# Patient Record
Sex: MALE | Race: BLACK OR AFRICAN AMERICAN | NOT HISPANIC OR LATINO | Employment: UNEMPLOYED | URBAN - METROPOLITAN AREA
[De-identification: names, ages, dates, MRNs, and addresses within clinical notes are randomized per-mention and may not be internally consistent; named-entity substitution may affect disease eponyms.]

---

## 2020-12-16 ENCOUNTER — HISTORICAL (OUTPATIENT)
Dept: ADMINISTRATIVE | Facility: HOSPITAL | Age: 55
End: 2020-12-16

## 2020-12-16 LAB
ABS NEUT (OLG): 4.42 X10(3)/MCL (ref 2.1–9.2)
ALBUMIN SERPL-MCNC: 4.2 GM/DL (ref 3.5–5)
ALBUMIN/GLOB SERPL: 1 RATIO (ref 1.1–2)
ALP SERPL-CCNC: 60 UNIT/L (ref 40–150)
ALT SERPL-CCNC: 41 UNIT/L (ref 0–55)
AST SERPL-CCNC: 37 UNIT/L (ref 5–34)
BASOPHILS # BLD AUTO: 0 X10(3)/MCL (ref 0–0.2)
BASOPHILS NFR BLD AUTO: 0 %
BILIRUB SERPL-MCNC: 1.1 MG/DL
BILIRUBIN DIRECT+TOT PNL SERPL-MCNC: 0.4 MG/DL (ref 0–0.5)
BILIRUBIN DIRECT+TOT PNL SERPL-MCNC: 0.7 MG/DL (ref 0–0.8)
BUN SERPL-MCNC: 11 MG/DL (ref 8.4–25.7)
CALCIUM SERPL-MCNC: 9.9 MG/DL (ref 8.4–10.2)
CHLORIDE SERPL-SCNC: 98 MMOL/L (ref 98–107)
CHOLEST SERPL-MCNC: 181 MG/DL
CHOLEST/HDLC SERPL: 5 {RATIO} (ref 0–5)
CO2 SERPL-SCNC: 34 MMOL/L (ref 22–29)
CREAT SERPL-MCNC: 1.03 MG/DL (ref 0.73–1.18)
EOSINOPHIL # BLD AUTO: 0.4 X10(3)/MCL (ref 0–0.9)
EOSINOPHIL NFR BLD AUTO: 4 %
ERYTHROCYTE [DISTWIDTH] IN BLOOD BY AUTOMATED COUNT: 11.4 % (ref 11.5–14.5)
EST. AVERAGE GLUCOSE BLD GHB EST-MCNC: 85.3 MG/DL
GLOBULIN SER-MCNC: 4.2 GM/DL (ref 2.4–3.5)
GLUCOSE SERPL-MCNC: 96 MG/DL (ref 74–100)
HBA1C MFR BLD: 4.6 %
HCT VFR BLD AUTO: 47.4 % (ref 40–51)
HDLC SERPL-MCNC: 40 MG/DL (ref 35–60)
HGB BLD-MCNC: 16.4 GM/DL (ref 13.5–17.5)
IMM GRANULOCYTES # BLD AUTO: 0.04 10*3/UL
IMM GRANULOCYTES NFR BLD AUTO: 0 %
LDLC SERPL CALC-MCNC: 122 MG/DL (ref 50–140)
LYMPHOCYTES # BLD AUTO: 3.9 X10(3)/MCL (ref 0.6–4.6)
LYMPHOCYTES NFR BLD AUTO: 40 %
MCH RBC QN AUTO: 33.1 PG (ref 26–34)
MCHC RBC AUTO-ENTMCNC: 34.6 GM/DL (ref 31–37)
MCV RBC AUTO: 95.8 FL (ref 80–100)
MONOCYTES # BLD AUTO: 1.1 X10(3)/MCL (ref 0.1–1.3)
MONOCYTES NFR BLD AUTO: 12 %
NEUTROPHILS # BLD AUTO: 4.42 X10(3)/MCL (ref 2.1–9.2)
NEUTROPHILS NFR BLD AUTO: 45 %
PLATELET # BLD AUTO: 241 X10(3)/MCL (ref 130–400)
PMV BLD AUTO: 8.8 FL (ref 7.4–10.4)
POTASSIUM SERPL-SCNC: 3.2 MMOL/L (ref 3.5–5.1)
PROT SERPL-MCNC: 8.4 GM/DL (ref 6.4–8.3)
RBC # BLD AUTO: 4.95 X10(6)/MCL (ref 4.5–5.9)
SODIUM SERPL-SCNC: 142 MMOL/L (ref 136–145)
T4 FREE SERPL-MCNC: 1.11 NG/DL (ref 0.7–1.48)
TRIGL SERPL-MCNC: 93 MG/DL (ref 34–140)
TSH SERPL-ACNC: 0.65 UIU/ML (ref 0.35–4.94)
VLDLC SERPL CALC-MCNC: 19 MG/DL
WBC # SPEC AUTO: 9.9 X10(3)/MCL (ref 4.5–11)

## 2021-01-27 ENCOUNTER — HISTORICAL (OUTPATIENT)
Dept: ADMINISTRATIVE | Facility: HOSPITAL | Age: 56
End: 2021-01-27

## 2021-01-27 LAB
BUN SERPL-MCNC: 10 MG/DL (ref 8.4–25.7)
CALCIUM SERPL-MCNC: 9.8 MG/DL (ref 8.4–10.2)
CHLORIDE SERPL-SCNC: 97 MMOL/L (ref 98–107)
CO2 SERPL-SCNC: 32 MMOL/L (ref 22–29)
CREAT SERPL-MCNC: 1.16 MG/DL (ref 0.73–1.18)
CREAT/UREA NIT SERPL: 9
GLUCOSE SERPL-MCNC: 108 MG/DL (ref 74–100)
POTASSIUM SERPL-SCNC: 3.1 MMOL/L (ref 3.5–5.1)
SODIUM SERPL-SCNC: 141 MMOL/L (ref 136–145)

## 2021-02-18 ENCOUNTER — HISTORICAL (OUTPATIENT)
Dept: FAMILY MEDICINE | Facility: CLINIC | Age: 56
End: 2021-02-18

## 2021-03-10 ENCOUNTER — HISTORICAL (OUTPATIENT)
Dept: FAMILY MEDICINE | Facility: CLINIC | Age: 56
End: 2021-03-10

## 2021-03-10 LAB
ALBUMIN SERPL-MCNC: 4 GM/DL (ref 3.5–5)
ALBUMIN/GLOB SERPL: 1.1 RATIO (ref 1.1–2)
ALP SERPL-CCNC: 57 UNIT/L (ref 40–150)
ALT SERPL-CCNC: 72 UNIT/L (ref 0–55)
AST SERPL-CCNC: 64 UNIT/L (ref 5–34)
BILIRUB SERPL-MCNC: 1.3 MG/DL
BILIRUBIN DIRECT+TOT PNL SERPL-MCNC: 0.5 MG/DL (ref 0–0.5)
BILIRUBIN DIRECT+TOT PNL SERPL-MCNC: 0.8 MG/DL (ref 0–0.8)
BUN SERPL-MCNC: 8.8 MG/DL (ref 8.4–25.7)
CALCIUM SERPL-MCNC: 9.4 MG/DL (ref 8.4–10.2)
CHLORIDE SERPL-SCNC: 99 MMOL/L (ref 98–107)
CO2 SERPL-SCNC: 29 MMOL/L (ref 22–29)
CREAT SERPL-MCNC: 0.96 MG/DL (ref 0.73–1.18)
GLOBULIN SER-MCNC: 3.8 GM/DL (ref 2.4–3.5)
GLUCOSE SERPL-MCNC: 115 MG/DL (ref 74–100)
POTASSIUM SERPL-SCNC: 3.4 MMOL/L (ref 3.5–5.1)
PROT SERPL-MCNC: 7.8 GM/DL (ref 6.4–8.3)
SODIUM SERPL-SCNC: 138 MMOL/L (ref 136–145)

## 2021-06-08 ENCOUNTER — HISTORICAL (OUTPATIENT)
Dept: FAMILY MEDICINE | Facility: CLINIC | Age: 56
End: 2021-06-08

## 2021-06-08 LAB
ABS NEUT (OLG): 3.42 X10(3)/MCL (ref 2.1–9.2)
ALBUMIN SERPL-MCNC: 4.1 GM/DL (ref 3.5–5)
ALBUMIN/GLOB SERPL: 1 RATIO (ref 1.1–2)
ALP SERPL-CCNC: 60 UNIT/L (ref 40–150)
ALT SERPL-CCNC: 124 UNIT/L (ref 0–55)
APPEARANCE, UA: CLEAR
AST SERPL-CCNC: 113 UNIT/L (ref 5–34)
BACTERIA #/AREA URNS AUTO: ABNORMAL /HPF
BASOPHILS # BLD AUTO: 0 X10(3)/MCL (ref 0–0.2)
BASOPHILS NFR BLD AUTO: 0 %
BILIRUB SERPL-MCNC: 1 MG/DL
BILIRUB UR QL STRIP: NEGATIVE
BILIRUBIN DIRECT+TOT PNL SERPL-MCNC: 0.4 MG/DL (ref 0–0.5)
BILIRUBIN DIRECT+TOT PNL SERPL-MCNC: 0.6 MG/DL (ref 0–0.8)
BUN SERPL-MCNC: 7.7 MG/DL (ref 8.4–25.7)
CALCIUM SERPL-MCNC: 9.7 MG/DL (ref 8.4–10.2)
CHLORIDE SERPL-SCNC: 101 MMOL/L (ref 98–107)
CHOLEST SERPL-MCNC: 153 MG/DL
CHOLEST/HDLC SERPL: 4 {RATIO} (ref 0–5)
CO2 SERPL-SCNC: 28 MMOL/L (ref 22–29)
COLOR UR: YELLOW
CREAT SERPL-MCNC: 1.18 MG/DL (ref 0.73–1.18)
EOSINOPHIL # BLD AUTO: 0.2 X10(3)/MCL (ref 0–0.9)
EOSINOPHIL NFR BLD AUTO: 2 %
ERYTHROCYTE [DISTWIDTH] IN BLOOD BY AUTOMATED COUNT: 11.6 % (ref 11.5–14.5)
EST. AVERAGE GLUCOSE BLD GHB EST-MCNC: 91.1 MG/DL
GLOBULIN SER-MCNC: 4 GM/DL (ref 2.4–3.5)
GLUCOSE (UA): NEGATIVE
GLUCOSE SERPL-MCNC: 114 MG/DL (ref 74–100)
HBA1C MFR BLD: 4.8 %
HCT VFR BLD AUTO: 47.3 % (ref 40–51)
HDLC SERPL-MCNC: 37 MG/DL (ref 35–60)
HGB BLD-MCNC: 16.2 GM/DL (ref 13.5–17.5)
HGB UR QL STRIP: NEGATIVE
HYALINE CASTS #/AREA URNS LPF: ABNORMAL /LPF
IMM GRANULOCYTES # BLD AUTO: 0.03 10*3/UL
IMM GRANULOCYTES NFR BLD AUTO: 0 %
KETONES UR QL STRIP: NEGATIVE
LDLC SERPL CALC-MCNC: 102 MG/DL (ref 50–140)
LEUKOCYTE ESTERASE UR QL STRIP: NEGATIVE
LYMPHOCYTES # BLD AUTO: 4.3 X10(3)/MCL (ref 0.6–4.6)
LYMPHOCYTES NFR BLD AUTO: 46 %
MCH RBC QN AUTO: 32.4 PG (ref 26–34)
MCHC RBC AUTO-ENTMCNC: 34.2 GM/DL (ref 31–37)
MCV RBC AUTO: 94.6 FL (ref 80–100)
MONOCYTES # BLD AUTO: 1.5 X10(3)/MCL (ref 0.1–1.3)
MONOCYTES NFR BLD AUTO: 16 %
NEUTROPHILS # BLD AUTO: 3.42 X10(3)/MCL (ref 2.1–9.2)
NEUTROPHILS NFR BLD AUTO: 36 %
NITRITE UR QL STRIP: NEGATIVE
NRBC BLD AUTO-RTO: 0 % (ref 0–0.2)
PH UR STRIP: 6.5 [PH] (ref 4.5–8)
PLATELET # BLD AUTO: 224 X10(3)/MCL (ref 130–400)
PMV BLD AUTO: 8.3 FL (ref 7.4–10.4)
POTASSIUM SERPL-SCNC: 3.9 MMOL/L (ref 3.5–5.1)
PROT SERPL-MCNC: 8.1 GM/DL (ref 6.4–8.3)
PROT UR QL STRIP: NEGATIVE
RBC # BLD AUTO: 5 X10(6)/MCL (ref 4.5–5.9)
RBC #/AREA URNS AUTO: ABNORMAL /HPF
SODIUM SERPL-SCNC: 138 MMOL/L (ref 136–145)
SP GR UR STRIP: 1.01 (ref 1–1.03)
SQUAMOUS #/AREA URNS LPF: ABNORMAL /LPF
TRIGL SERPL-MCNC: 71 MG/DL (ref 34–140)
TSH SERPL-ACNC: 0.87 UIU/ML (ref 0.35–4.94)
UROBILINOGEN UR STRIP-ACNC: NORMAL
VLDLC SERPL CALC-MCNC: 14 MG/DL
WBC # SPEC AUTO: 9.4 X10(3)/MCL (ref 4.5–11)
WBC #/AREA URNS AUTO: ABNORMAL /HPF

## 2021-07-08 ENCOUNTER — HISTORICAL (OUTPATIENT)
Dept: FAMILY MEDICINE | Facility: CLINIC | Age: 56
End: 2021-07-08

## 2021-07-08 LAB
HAV IGM SERPL QL IA: NONREACTIVE
HBV CORE IGM SERPL QL IA: ABNORMAL
HBV SURFACE AG SERPL QL IA: NONREACTIVE
HCV AB SERPL QL IA: REACTIVE

## 2021-07-30 ENCOUNTER — HISTORICAL (OUTPATIENT)
Dept: RADIOLOGY | Facility: HOSPITAL | Age: 56
End: 2021-07-30

## 2021-08-24 ENCOUNTER — HISTORICAL (OUTPATIENT)
Dept: FAMILY MEDICINE | Facility: CLINIC | Age: 56
End: 2021-08-24

## 2021-08-24 LAB
ALBUMIN SERPL-MCNC: 4 GM/DL (ref 3.5–5)
ALBUMIN/GLOB SERPL: 1 RATIO (ref 1.1–2)
ALP SERPL-CCNC: 57 UNIT/L (ref 40–150)
ALT SERPL-CCNC: 92 UNIT/L (ref 0–55)
AST SERPL-CCNC: 80 UNIT/L (ref 5–34)
BILIRUB SERPL-MCNC: 0.9 MG/DL
BILIRUBIN DIRECT+TOT PNL SERPL-MCNC: 0.3 MG/DL (ref 0–0.5)
BILIRUBIN DIRECT+TOT PNL SERPL-MCNC: 0.6 MG/DL (ref 0–0.8)
BUN SERPL-MCNC: 10.2 MG/DL (ref 8.4–25.7)
CALCIUM SERPL-MCNC: 9.7 MG/DL (ref 8.4–10.2)
CHLORIDE SERPL-SCNC: 104 MMOL/L (ref 98–107)
CO2 SERPL-SCNC: 25 MMOL/L (ref 22–29)
CREAT SERPL-MCNC: 1.1 MG/DL (ref 0.73–1.18)
GLOBULIN SER-MCNC: 4.2 GM/DL (ref 2.4–3.5)
GLUCOSE SERPL-MCNC: 98 MG/DL (ref 74–100)
HAV IGM SERPL QL IA: NONREACTIVE
HBV CORE IGM SERPL QL IA: ABNORMAL
HBV SURFACE AG SERPL QL IA: NONREACTIVE
HCV AB SERPL QL IA: REACTIVE
POTASSIUM SERPL-SCNC: 4.4 MMOL/L (ref 3.5–5.1)
PROT SERPL-MCNC: 8.2 GM/DL (ref 6.4–8.3)
SODIUM SERPL-SCNC: 139 MMOL/L (ref 136–145)

## 2021-10-18 ENCOUNTER — HISTORICAL (OUTPATIENT)
Dept: FAMILY MEDICINE | Facility: CLINIC | Age: 56
End: 2021-10-18

## 2021-10-18 LAB
CHOLEST SERPL-MCNC: 133 MG/DL
CHOLEST/HDLC SERPL: 4 {RATIO} (ref 0–5)
HDLC SERPL-MCNC: 35 MG/DL (ref 35–60)
LDLC SERPL CALC-MCNC: 83 MG/DL (ref 50–140)
TRIGL SERPL-MCNC: 73 MG/DL (ref 34–140)
VLDLC SERPL CALC-MCNC: 15 MG/DL

## 2021-11-08 ENCOUNTER — HISTORICAL (OUTPATIENT)
Dept: ADMINISTRATIVE | Facility: HOSPITAL | Age: 56
End: 2021-11-08

## 2021-11-08 LAB
ABS NEUT (OLG): 5.32 X10(3)/MCL (ref 2.1–9.2)
ALBUMIN SERPL-MCNC: 4.4 GM/DL (ref 3.5–5)
ALBUMIN/GLOB SERPL: 1 RATIO (ref 1.1–2)
ALP SERPL-CCNC: 68 UNIT/L (ref 40–150)
ALT SERPL-CCNC: 100 UNIT/L (ref 0–55)
AST SERPL-CCNC: 81 UNIT/L (ref 5–34)
BASOPHILS # BLD AUTO: 0 X10(3)/MCL (ref 0–0.2)
BASOPHILS NFR BLD AUTO: 0 %
BILIRUB SERPL-MCNC: 1 MG/DL
BILIRUBIN DIRECT+TOT PNL SERPL-MCNC: 0.5 MG/DL (ref 0–0.5)
BILIRUBIN DIRECT+TOT PNL SERPL-MCNC: 0.5 MG/DL (ref 0–0.8)
BUN SERPL-MCNC: 9.2 MG/DL (ref 8.4–25.7)
CALCIUM SERPL-MCNC: 10.2 MG/DL (ref 8.7–10.5)
CHLORIDE SERPL-SCNC: 99 MMOL/L (ref 98–107)
CO2 SERPL-SCNC: 26 MMOL/L (ref 22–29)
CREAT SERPL-MCNC: 1.11 MG/DL (ref 0.73–1.18)
EOSINOPHIL # BLD AUTO: 0.1 X10(3)/MCL (ref 0–0.9)
EOSINOPHIL NFR BLD AUTO: 1 %
ERYTHROCYTE [DISTWIDTH] IN BLOOD BY AUTOMATED COUNT: 11.3 % (ref 11.5–14.5)
FERRITIN SERPL-MCNC: 427.02 NG/ML (ref 21.81–274.66)
GLOBULIN SER-MCNC: 4.6 GM/DL (ref 2.4–3.5)
GLUCOSE SERPL-MCNC: 94 MG/DL (ref 74–100)
HAV AB SER QL IA: REACTIVE
HBV CORE AB SERPL QL IA: REACTIVE
HBV SURFACE AB SER-ACNC: 0.88 MIU/ML
HBV SURFACE AB SERPL IA-ACNC: NONREACTIVE M[IU]/ML
HCT VFR BLD AUTO: 50.1 % (ref 40–51)
HCV AB SERPL QL IA: REACTIVE
HGB BLD-MCNC: 16.5 GM/DL (ref 13.5–17.5)
HIV 1+2 AB+HIV1 P24 AG SERPL QL IA: NONREACTIVE
IMM GRANULOCYTES # BLD AUTO: 0.06 10*3/UL
IMM GRANULOCYTES NFR BLD AUTO: 1 %
INR PPP: 1.04 (ref 0.9–1.2)
IRON SATN MFR SERPL: 24 % (ref 20–50)
IRON SERPL-MCNC: 88 UG/DL (ref 65–175)
LYMPHOCYTES # BLD AUTO: 3.8 X10(3)/MCL (ref 0.6–4.6)
LYMPHOCYTES NFR BLD AUTO: 35 %
MCH RBC QN AUTO: 31.6 PG (ref 26–34)
MCHC RBC AUTO-ENTMCNC: 32.9 GM/DL (ref 31–37)
MCV RBC AUTO: 96 FL (ref 80–100)
MONOCYTES # BLD AUTO: 1.4 X10(3)/MCL (ref 0.1–1.3)
MONOCYTES NFR BLD AUTO: 13 %
NEUTROPHILS # BLD AUTO: 5.32 X10(3)/MCL (ref 2.1–9.2)
NEUTROPHILS NFR BLD AUTO: 50 %
NRBC BLD AUTO-RTO: 0 % (ref 0–0.2)
PLATELET # BLD AUTO: 235 X10(3)/MCL (ref 130–400)
PMV BLD AUTO: 8.3 FL (ref 7.4–10.4)
POTASSIUM SERPL-SCNC: 4 MMOL/L (ref 3.5–5.1)
PROT SERPL-MCNC: 9 GM/DL (ref 6.4–8.3)
PROTHROMBIN TIME: 13.4 SECOND(S) (ref 11.9–14.4)
RBC # BLD AUTO: 5.22 X10(6)/MCL (ref 4.5–5.9)
RPR SER QL: NORMAL
SODIUM SERPL-SCNC: 136 MMOL/L (ref 136–145)
T PALLIDUM AB SER QL: REACTIVE
TIBC SERPL-MCNC: 272 UG/DL (ref 69–240)
TIBC SERPL-MCNC: 360 UG/DL (ref 250–450)
TRANSFERRIN SERPL-MCNC: 310 MG/DL (ref 174–364)
WBC # SPEC AUTO: 10.7 X10(3)/MCL (ref 4.5–11)

## 2021-12-13 ENCOUNTER — HISTORICAL (OUTPATIENT)
Dept: ADMINISTRATIVE | Facility: HOSPITAL | Age: 56
End: 2021-12-13

## 2021-12-13 LAB
ABS NEUT (OLG): 4.07 X10(3)/MCL (ref 2.1–9.2)
ALBUMIN SERPL-MCNC: 4 GM/DL (ref 3.5–5)
ALBUMIN/GLOB SERPL: 0.9 RATIO (ref 1.1–2)
ALP SERPL-CCNC: 65 UNIT/L (ref 40–150)
ALT SERPL-CCNC: 23 UNIT/L (ref 0–55)
AST SERPL-CCNC: 21 UNIT/L (ref 5–34)
BASOPHILS # BLD AUTO: 0 X10(3)/MCL (ref 0–0.2)
BASOPHILS NFR BLD AUTO: 0 %
BILIRUB SERPL-MCNC: 0.7 MG/DL
BILIRUBIN DIRECT+TOT PNL SERPL-MCNC: 0.3 MG/DL (ref 0–0.5)
BILIRUBIN DIRECT+TOT PNL SERPL-MCNC: 0.4 MG/DL (ref 0–0.8)
BUN SERPL-MCNC: 8.4 MG/DL (ref 8.4–25.7)
CALCIUM SERPL-MCNC: 9.8 MG/DL (ref 8.7–10.5)
CHLORIDE SERPL-SCNC: 104 MMOL/L (ref 98–107)
CO2 SERPL-SCNC: 28 MMOL/L (ref 22–29)
CREAT SERPL-MCNC: 1.07 MG/DL (ref 0.73–1.18)
EOSINOPHIL # BLD AUTO: 0.2 X10(3)/MCL (ref 0–0.9)
EOSINOPHIL NFR BLD AUTO: 2 %
ERYTHROCYTE [DISTWIDTH] IN BLOOD BY AUTOMATED COUNT: 11.3 % (ref 11.5–14.5)
GLOBULIN SER-MCNC: 4.4 GM/DL (ref 2.4–3.5)
GLUCOSE SERPL-MCNC: 101 MG/DL (ref 74–100)
HCT VFR BLD AUTO: 46 % (ref 40–51)
HGB BLD-MCNC: 15.6 GM/DL (ref 13.5–17.5)
IMM GRANULOCYTES # BLD AUTO: 0.05 10*3/UL
IMM GRANULOCYTES NFR BLD AUTO: 0 %
LYMPHOCYTES # BLD AUTO: 4.9 X10(3)/MCL (ref 0.6–4.6)
LYMPHOCYTES NFR BLD AUTO: 47 %
MCH RBC QN AUTO: 32.7 PG (ref 26–34)
MCHC RBC AUTO-ENTMCNC: 33.9 GM/DL (ref 31–37)
MCV RBC AUTO: 96.4 FL (ref 80–100)
MONOCYTES # BLD AUTO: 1.2 X10(3)/MCL (ref 0.1–1.3)
MONOCYTES NFR BLD AUTO: 12 %
NEUTROPHILS # BLD AUTO: 4.07 X10(3)/MCL (ref 2.1–9.2)
NEUTROPHILS NFR BLD AUTO: 39 %
NRBC BLD AUTO-RTO: 0 % (ref 0–0.2)
PLATELET # BLD AUTO: 212 X10(3)/MCL (ref 130–400)
PMV BLD AUTO: 8.4 FL (ref 7.4–10.4)
POTASSIUM SERPL-SCNC: 3.9 MMOL/L (ref 3.5–5.1)
PROT SERPL-MCNC: 8.4 GM/DL (ref 6.4–8.3)
RBC # BLD AUTO: 4.77 X10(6)/MCL (ref 4.5–5.9)
SODIUM SERPL-SCNC: 140 MMOL/L (ref 136–145)
WBC # SPEC AUTO: 10.5 X10(3)/MCL (ref 4.5–11)

## 2022-01-10 ENCOUNTER — HISTORICAL (OUTPATIENT)
Dept: ADMINISTRATIVE | Facility: HOSPITAL | Age: 57
End: 2022-01-10

## 2022-01-10 LAB
ABS NEUT (OLG): 4.78 X10(3)/MCL (ref 2.1–9.2)
ALBUMIN SERPL-MCNC: 4 GM/DL (ref 3.5–5)
ALBUMIN/GLOB SERPL: 1 RATIO (ref 1.1–2)
ALP SERPL-CCNC: 63 UNIT/L (ref 40–150)
ALT SERPL-CCNC: 16 UNIT/L (ref 0–55)
AST SERPL-CCNC: 17 UNIT/L (ref 5–34)
BASOPHILS # BLD AUTO: 0 X10(3)/MCL (ref 0–0.2)
BASOPHILS NFR BLD AUTO: 0 %
BILIRUB SERPL-MCNC: 0.7 MG/DL
BILIRUBIN DIRECT+TOT PNL SERPL-MCNC: 0.3 MG/DL (ref 0–0.5)
BILIRUBIN DIRECT+TOT PNL SERPL-MCNC: 0.4 MG/DL (ref 0–0.8)
BUN SERPL-MCNC: 5.2 MG/DL (ref 8.4–25.7)
CALCIUM SERPL-MCNC: 9.6 MG/DL (ref 8.7–10.5)
CHLORIDE SERPL-SCNC: 105 MMOL/L (ref 98–107)
CO2 SERPL-SCNC: 27 MMOL/L (ref 22–29)
CREAT SERPL-MCNC: 0.92 MG/DL (ref 0.73–1.18)
EOSINOPHIL # BLD AUTO: 0.3 X10(3)/MCL (ref 0–0.9)
EOSINOPHIL NFR BLD AUTO: 3 %
ERYTHROCYTE [DISTWIDTH] IN BLOOD BY AUTOMATED COUNT: 11.8 % (ref 11.5–14.5)
GLOBULIN SER-MCNC: 4.1 GM/DL (ref 2.4–3.5)
GLUCOSE SERPL-MCNC: 108 MG/DL (ref 74–100)
HCT VFR BLD AUTO: 44.4 % (ref 40–51)
HGB BLD-MCNC: 15 GM/DL (ref 13.5–17.5)
IMM GRANULOCYTES # BLD AUTO: 0.04 10*3/UL
IMM GRANULOCYTES NFR BLD AUTO: 0 %
LYMPHOCYTES # BLD AUTO: 4.1 X10(3)/MCL (ref 0.6–4.6)
LYMPHOCYTES NFR BLD AUTO: 39 %
MCH RBC QN AUTO: 32.8 PG (ref 26–34)
MCHC RBC AUTO-ENTMCNC: 33.8 GM/DL (ref 31–37)
MCV RBC AUTO: 96.9 FL (ref 80–100)
MONOCYTES # BLD AUTO: 1.2 X10(3)/MCL (ref 0.1–1.3)
MONOCYTES NFR BLD AUTO: 11 %
NEUTROPHILS # BLD AUTO: 4.78 X10(3)/MCL (ref 2.1–9.2)
NEUTROPHILS NFR BLD AUTO: 46 %
NRBC BLD AUTO-RTO: 0 % (ref 0–0.2)
PLATELET # BLD AUTO: 236 X10(3)/MCL (ref 130–400)
PMV BLD AUTO: 8.5 FL (ref 7.4–10.4)
POTASSIUM SERPL-SCNC: 3.6 MMOL/L (ref 3.5–5.1)
PROT SERPL-MCNC: 8.1 GM/DL (ref 6.4–8.3)
RBC # BLD AUTO: 4.58 X10(6)/MCL (ref 4.5–5.9)
SODIUM SERPL-SCNC: 141 MMOL/L (ref 136–145)
WBC # SPEC AUTO: 10.5 X10(3)/MCL (ref 4.5–11)

## 2022-02-07 ENCOUNTER — HISTORICAL (OUTPATIENT)
Dept: FAMILY MEDICINE | Facility: CLINIC | Age: 57
End: 2022-02-07

## 2022-02-07 LAB
ABS NEUT (OLG): 4.88 (ref 2.1–9.2)
ALBUMIN SERPL-MCNC: 4.2 G/DL (ref 3.5–5)
ALBUMIN/GLOB SERPL: 1.1 {RATIO} (ref 1.1–2)
ALP SERPL-CCNC: 61 U/L (ref 40–150)
ALT SERPL-CCNC: 17 U/L (ref 0–55)
AST SERPL-CCNC: 19 U/L (ref 5–34)
BASOPHILS # BLD AUTO: 0 10*3/UL (ref 0–0.2)
BASOPHILS NFR BLD AUTO: 0 %
BILIRUB SERPL-MCNC: 0.8 MG/DL
BILIRUBIN DIRECT+TOT PNL SERPL-MCNC: 0.3 (ref 0–0.5)
BILIRUBIN DIRECT+TOT PNL SERPL-MCNC: 0.5 (ref 0–0.8)
BUN SERPL-MCNC: 8.7 MG/DL (ref 8.4–25.7)
CALCIUM SERPL-MCNC: 10.2 MG/DL (ref 8.7–10.5)
CHLORIDE SERPL-SCNC: 104 MMOL/L (ref 98–107)
CO2 SERPL-SCNC: 29 MMOL/L (ref 22–29)
CREAT SERPL-MCNC: 1.17 MG/DL (ref 0.73–1.18)
EOSINOPHIL # BLD AUTO: 0.3 10*3/UL (ref 0–0.9)
EOSINOPHIL NFR BLD AUTO: 3 %
ERYTHROCYTE [DISTWIDTH] IN BLOOD BY AUTOMATED COUNT: 11.5 % (ref 11.5–14.5)
FLAG2 (OHS): 70
FLAG3 (OHS): 90
FLAGS (OHS): 70
GLOBULIN SER-MCNC: 3.9 G/DL (ref 2.4–3.5)
GLUCOSE SERPL-MCNC: 109 MG/DL (ref 74–100)
HCT VFR BLD AUTO: 46.6 % (ref 40–51)
HEMOLYSIS INTERF INDEX SERPL-ACNC: 7
HGB BLD-MCNC: 16 G/DL (ref 13.5–17.5)
ICTERIC INTERF INDEX SERPL-ACNC: 1
IMM GRANULOCYTES # BLD AUTO: 0.03 10*3/UL
IMM GRANULOCYTES NFR BLD AUTO: 0 %
IMM. NE 1 SUSPECT FLAG (OHS): 10
LIPEMIC INTERF INDEX SERPL-ACNC: 9
LOW EVENT # SUSPECT FLAG (OHS): 70
LYMPHOCYTES # BLD AUTO: 4.4 10*3/UL (ref 0.6–4.6)
LYMPHOCYTES NFR BLD AUTO: 41 %
MANUAL DIFF? (OHS): NO
MCH RBC QN AUTO: 33.1 PG (ref 26–34)
MCHC RBC AUTO-ENTMCNC: 34.3 G/DL (ref 31–37)
MCV RBC AUTO: 96.3 FL (ref 80–100)
MO BLASTS SUSPECT FLAG (OHS): 60
MONOCYTES # BLD AUTO: 1 10*3/UL (ref 0.1–1.3)
MONOCYTES NFR BLD AUTO: 10 %
NEUTROPHILS # BLD AUTO: 4.88 10*3/UL (ref 2.1–9.2)
NEUTROPHILS NFR BLD AUTO: 45 %
NRBC BLD AUTO-RTO: 0 % (ref 0–0.2)
PLATELET # BLD AUTO: 225 10*3/UL (ref 130–400)
PMV BLD AUTO: 8.6 FL (ref 7.4–10.4)
POTASSIUM SERPL-SCNC: 3.7 MMOL/L (ref 3.5–5.1)
PROT SERPL-MCNC: 8.1 G/DL (ref 6.4–8.3)
RBC # BLD AUTO: 4.84 10*6/UL (ref 4.5–5.9)
SODIUM SERPL-SCNC: 140 MMOL/L (ref 136–145)
WBC # SPEC AUTO: 10.7 10*3/UL (ref 4.5–11)

## 2022-04-10 ENCOUNTER — HISTORICAL (OUTPATIENT)
Dept: ADMINISTRATIVE | Facility: HOSPITAL | Age: 57
End: 2022-04-10
Payer: MEDICAID

## 2022-04-21 ENCOUNTER — HISTORICAL (OUTPATIENT)
Dept: FAMILY MEDICINE | Facility: CLINIC | Age: 57
End: 2022-04-21
Payer: MEDICAID

## 2022-04-21 LAB
ABS NEUT (OLG): 4.5 (ref 2.1–9.2)
ALBUMIN SERPL-MCNC: 4.1 G/DL (ref 3.5–5)
ALBUMIN/GLOB SERPL: 1.1 {RATIO} (ref 1.1–2)
ALP SERPL-CCNC: 68 U/L (ref 40–150)
ALT SERPL-CCNC: 20 U/L (ref 0–55)
AST SERPL-CCNC: 33 U/L (ref 5–34)
BASOPHILS # BLD AUTO: 0.1 10*3/UL (ref 0–0.2)
BASOPHILS NFR BLD AUTO: 1 %
BILIRUB SERPL-MCNC: 1 MG/DL
BILIRUBIN DIRECT+TOT PNL SERPL-MCNC: 0.5 (ref 0–0.5)
BILIRUBIN DIRECT+TOT PNL SERPL-MCNC: 0.5 (ref 0–0.8)
BUN SERPL-MCNC: 12.1 MG/DL (ref 8.4–25.7)
CALCIUM SERPL-MCNC: 9.7 MG/DL (ref 8.7–10.5)
CHLORIDE SERPL-SCNC: 103 MMOL/L (ref 98–107)
CHOLEST SERPL-MCNC: 117 MG/DL
CHOLEST/HDLC SERPL: 3 {RATIO} (ref 0–5)
CO2 SERPL-SCNC: 28 MMOL/L (ref 22–29)
CREAT SERPL-MCNC: 1.01 MG/DL (ref 0.73–1.18)
EOSINOPHIL # BLD AUTO: 0.4 10*3/UL (ref 0–0.9)
EOSINOPHIL NFR BLD AUTO: 4 %
ERYTHROCYTE [DISTWIDTH] IN BLOOD BY AUTOMATED COUNT: 11.7 % (ref 11.5–14.5)
FLAG2 (OHS): 70
FLAG3 (OHS): 90
FLAGS (OHS): 80
GLOBULIN SER-MCNC: 3.8 G/DL (ref 2.4–3.5)
GLUCOSE SERPL-MCNC: 93 MG/DL (ref 74–100)
HCT VFR BLD AUTO: 43.5 % (ref 40–51)
HDLC SERPL-MCNC: 34 MG/DL (ref 35–60)
HEMOLYSIS INTERF INDEX SERPL-ACNC: 3
HGB BLD-MCNC: 14.9 G/DL (ref 13.5–17.5)
ICTERIC INTERF INDEX SERPL-ACNC: 1
IMM GRANULOCYTES # BLD AUTO: 0.03 10*3/UL
IMM GRANULOCYTES NFR BLD AUTO: 0 %
LDLC SERPL CALC-MCNC: 70 MG/DL (ref 50–140)
LIPEMIC INTERF INDEX SERPL-ACNC: 8
LOW EVENT # SUSPECT FLAG (OHS): 70
LYMPHOCYTES # BLD AUTO: 4.3 10*3/UL (ref 0.6–4.6)
LYMPHOCYTES NFR BLD AUTO: 39 %
MANUAL DIFF? (OHS): NO
MCH RBC QN AUTO: 31.7 PG (ref 26–34)
MCHC RBC AUTO-ENTMCNC: 34.3 G/DL (ref 31–37)
MCV RBC AUTO: 92.6 FL (ref 80–100)
MO BLASTS SUSPECT FLAG (OHS): 70
MONOCYTES # BLD AUTO: 1.6 10*3/UL (ref 0.1–1.3)
MONOCYTES NFR BLD AUTO: 15 %
NEUTROPHILS # BLD AUTO: 4.5 10*3/UL (ref 2.1–9.2)
NEUTROPHILS NFR BLD AUTO: 41 %
NRBC BLD AUTO-RTO: 0 % (ref 0–0.2)
PLATELET # BLD AUTO: 239 10*3/UL (ref 130–400)
PMV BLD AUTO: 8.6 FL (ref 7.4–10.4)
POTASSIUM SERPL-SCNC: 3.4 MMOL/L (ref 3.5–5.1)
PROT SERPL-MCNC: 7.9 G/DL (ref 6.4–8.3)
RBC # BLD AUTO: 4.7 10*6/UL (ref 4.5–5.9)
SODIUM SERPL-SCNC: 139 MMOL/L (ref 136–145)
TRIGL SERPL-MCNC: 67 MG/DL (ref 34–140)
TSH SERPL-ACNC: 0.83 M[IU]/L (ref 0.35–4.94)
VLDLC SERPL CALC-MCNC: 13 MG/DL
WBC # SPEC AUTO: 10.9 10*3/UL (ref 4.5–11)

## 2022-04-24 VITALS
OXYGEN SATURATION: 97 % | HEIGHT: 68 IN | SYSTOLIC BLOOD PRESSURE: 119 MMHG | DIASTOLIC BLOOD PRESSURE: 75 MMHG | BODY MASS INDEX: 27.1 KG/M2 | WEIGHT: 178.81 LBS

## 2022-05-19 ENCOUNTER — LAB VISIT (OUTPATIENT)
Dept: LAB | Facility: HOSPITAL | Age: 57
End: 2022-05-19
Attending: FAMILY MEDICINE
Payer: MEDICAID

## 2022-05-19 DIAGNOSIS — Z12.11 SCREENING FOR MALIGNANT NEOPLASM OF COLON: Primary | ICD-10-CM

## 2022-05-19 PROCEDURE — 87522 HEPATITIS C REVRS TRNSCRPJ: CPT

## 2022-05-19 PROCEDURE — 82274 ASSAY TEST FOR BLOOD FECAL: CPT

## 2022-05-19 PROCEDURE — 36415 COLL VENOUS BLD VENIPUNCTURE: CPT

## 2022-05-21 LAB — HCV RNA SERPL NAA+PROBE-ACNC: NORMAL IU/ML

## 2022-05-24 LAB
OB IA INT NEG CNTRL (OHS): NEGATIVE
OB IA INT POS CNTRL (OHS): POSITIVE
OCCULT BLD IA (OHS): NEGATIVE

## 2022-05-26 ENCOUNTER — OFFICE VISIT (OUTPATIENT)
Dept: INFECTIOUS DISEASES | Facility: CLINIC | Age: 57
End: 2022-05-26
Payer: MEDICAID

## 2022-05-26 ENCOUNTER — PROCEDURE VISIT (OUTPATIENT)
Dept: INFECTIOUS DISEASES | Facility: CLINIC | Age: 57
End: 2022-05-26
Payer: MEDICAID

## 2022-05-26 VITALS
BODY MASS INDEX: 26.79 KG/M2 | WEIGHT: 170.69 LBS | RESPIRATION RATE: 16 BRPM | DIASTOLIC BLOOD PRESSURE: 78 MMHG | HEIGHT: 67 IN | SYSTOLIC BLOOD PRESSURE: 147 MMHG | HEART RATE: 67 BPM | TEMPERATURE: 98 F

## 2022-05-26 DIAGNOSIS — Z86.19 HISTORY OF HEPATITIS C: Primary | ICD-10-CM

## 2022-05-26 DIAGNOSIS — K74.69 COMPENSATED CIRRHOSIS RELATED TO HEPATITIS C VIRUS (HCV): ICD-10-CM

## 2022-05-26 DIAGNOSIS — E78.5 HYPERLIPIDEMIA, UNSPECIFIED HYPERLIPIDEMIA TYPE: ICD-10-CM

## 2022-05-26 DIAGNOSIS — B19.20 COMPENSATED CIRRHOSIS RELATED TO HEPATITIS C VIRUS (HCV): ICD-10-CM

## 2022-05-26 DIAGNOSIS — Z72.0 TOBACCO USER: ICD-10-CM

## 2022-05-26 DIAGNOSIS — I10 HYPERTENSION, UNSPECIFIED TYPE: ICD-10-CM

## 2022-05-26 PROCEDURE — 3077F SYST BP >= 140 MM HG: CPT | Mod: CPTII,,, | Performed by: NURSE PRACTITIONER

## 2022-05-26 PROCEDURE — 99211 OFF/OP EST MAY X REQ PHY/QHP: CPT | Mod: PBBFAC

## 2022-05-26 PROCEDURE — 99215 OFFICE O/P EST HI 40 MIN: CPT | Mod: PBBFAC | Performed by: NURSE PRACTITIONER

## 2022-05-26 PROCEDURE — 3077F PR MOST RECENT SYSTOLIC BLOOD PRESSURE >= 140 MM HG: ICD-10-PCS | Mod: CPTII,,, | Performed by: NURSE PRACTITIONER

## 2022-05-26 PROCEDURE — 3008F PR BODY MASS INDEX (BMI) DOCUMENTED: ICD-10-PCS | Mod: CPTII,,, | Performed by: NURSE PRACTITIONER

## 2022-05-26 PROCEDURE — 4010F PR ACE/ARB THEARPY RXD/TAKEN: ICD-10-PCS | Mod: CPTII,,, | Performed by: NURSE PRACTITIONER

## 2022-05-26 PROCEDURE — 1159F PR MEDICATION LIST DOCUMENTED IN MEDICAL RECORD: ICD-10-PCS | Mod: CPTII,,, | Performed by: NURSE PRACTITIONER

## 2022-05-26 PROCEDURE — 3078F DIAST BP <80 MM HG: CPT | Mod: CPTII,,, | Performed by: NURSE PRACTITIONER

## 2022-05-26 PROCEDURE — 99213 OFFICE O/P EST LOW 20 MIN: CPT | Mod: S$PBB,,, | Performed by: NURSE PRACTITIONER

## 2022-05-26 PROCEDURE — 4010F ACE/ARB THERAPY RXD/TAKEN: CPT | Mod: CPTII,,, | Performed by: NURSE PRACTITIONER

## 2022-05-26 PROCEDURE — 1160F PR REVIEW ALL MEDS BY PRESCRIBER/CLIN PHARMACIST DOCUMENTED: ICD-10-PCS | Mod: CPTII,,, | Performed by: NURSE PRACTITIONER

## 2022-05-26 PROCEDURE — 3008F BODY MASS INDEX DOCD: CPT | Mod: CPTII,,, | Performed by: NURSE PRACTITIONER

## 2022-05-26 PROCEDURE — 1159F MED LIST DOCD IN RCRD: CPT | Mod: CPTII,,, | Performed by: NURSE PRACTITIONER

## 2022-05-26 PROCEDURE — 1160F RVW MEDS BY RX/DR IN RCRD: CPT | Mod: CPTII,,, | Performed by: NURSE PRACTITIONER

## 2022-05-26 PROCEDURE — 99213 PR OFFICE/OUTPT VISIT, EST, LEVL III, 20-29 MIN: ICD-10-PCS | Mod: S$PBB,,, | Performed by: NURSE PRACTITIONER

## 2022-05-26 PROCEDURE — 3078F PR MOST RECENT DIASTOLIC BLOOD PRESSURE < 80 MM HG: ICD-10-PCS | Mod: CPTII,,, | Performed by: NURSE PRACTITIONER

## 2022-05-26 RX ORDER — AMLODIPINE BESYLATE 10 MG/1
10 TABLET ORAL DAILY
COMMUNITY
Start: 2022-04-01 | End: 2022-08-07

## 2022-05-26 RX ORDER — ASPIRIN 81 MG/1
81 TABLET ORAL DAILY
COMMUNITY
Start: 2022-01-25 | End: 2023-04-25 | Stop reason: SDUPTHER

## 2022-05-26 RX ORDER — HYDRALAZINE HYDROCHLORIDE 50 MG/1
50 TABLET, FILM COATED ORAL 2 TIMES DAILY
COMMUNITY
Start: 2022-05-18 | End: 2022-08-23 | Stop reason: SDUPTHER

## 2022-05-26 RX ORDER — METOPROLOL TARTRATE 50 MG/1
50 TABLET ORAL 2 TIMES DAILY
COMMUNITY
Start: 2022-03-04 | End: 2022-08-23 | Stop reason: SDUPTHER

## 2022-05-26 NOTE — PROGRESS NOTES
Subjective:       Patient ID: Denis Ramon Jr. is a 56 y.o. male.    Chief Complaint: Follow-up (Hep C)    Denis Ramon is a 57 y/o AAM here for Hep C f/u visit. Referred to clinic by Dr. Brittany Dykes. Dx in the 80's.  Risk factors include IVDU. Denies tattoos or blood transfusion. Denies drug use. Tells me he has been alcohol free since November and is feeling great. Pt completed 12 weeks of Epclusa treatment on 2/7/22. He tells me he tolerated the meds well. Pretreatment labs done 8/24/21 Hep C VL 6.5 million, Gt1b, APRI 1.261, AST 80, ALT 92, Alk Phos 57. FibroScan done 11/8/21 F4. Liver US 7/30/21 shows hepatic steatosis. Week 4 labs done 12/13/21 Hep C VL ND on 12/13/21. Week 8 labs CBC and CMP WNL. Week 12 labs done 2/7/22 Hep C VL ND, no necroinflammation noted, CBC WNL. SVR12 Hep C VL ND 5/19/22. Hx of HLD. Pravastatin discontinued and Atorvastatin resumed at completion of Epclusa therapy. Pt denies fever, chills, headache, confusion, visual problems, icterus, jaundice, N/V/D, esophageal varices, SOB, cough, abdominal pain, dark urine or evangelist colored stools. Blood pressure is slighty elevated today. Pt tells me he took his metoprolol, lisinopril, amlodipine and hydralazine today. He is due for a Hep B #3 6/21/22. COVID and flu vaccinated. BMI 26. Hx of CVA 1 year ago. Smokes 1 ppd.      Review of Systems   Constitutional: Negative.    HENT: Negative.    Eyes: Negative.    Respiratory: Negative.    Cardiovascular: Negative.    Gastrointestinal: Negative.    Genitourinary: Negative.    Musculoskeletal: Negative.    Integumentary:  Negative.   Neurological: Negative.    Psychiatric/Behavioral: Negative.          Objective:      Physical Exam  Vitals reviewed.   Constitutional:       General: He is not in acute distress.     Appearance: Normal appearance.   HENT:      Head: Normocephalic.      Right Ear: External ear normal.      Left Ear: External ear normal.      Nose: Nose normal.      Mouth/Throat:       Mouth: Mucous membranes are moist.      Pharynx: Oropharynx is clear.      Comments: Missing multiple teeth   Eyes:      General: No scleral icterus.     Extraocular Movements: Extraocular movements intact.      Conjunctiva/sclera: Conjunctivae normal.      Pupils: Pupils are equal, round, and reactive to light.   Cardiovascular:      Rate and Rhythm: Normal rate and regular rhythm.      Pulses: Normal pulses.      Heart sounds: Normal heart sounds.   Pulmonary:      Effort: Pulmonary effort is normal. No respiratory distress.      Breath sounds: Normal breath sounds.   Abdominal:      General: Bowel sounds are normal. There is no distension.      Palpations: Abdomen is soft. There is no mass.      Tenderness: There is no abdominal tenderness. There is no right CVA tenderness or left CVA tenderness.      Hernia: No hernia is present.   Musculoskeletal:         General: No tenderness or signs of injury. Normal range of motion.      Cervical back: Normal range of motion and neck supple.      Right lower leg: No edema.      Left lower leg: No edema.   Lymphadenopathy:      Cervical: No cervical adenopathy.   Skin:     General: Skin is warm and dry.      Capillary Refill: Capillary refill takes less than 2 seconds.      Findings: No erythema or lesion.   Neurological:      General: No focal deficit present.      Mental Status: He is alert and oriented to person, place, and time. Mental status is at baseline.   Psychiatric:         Mood and Affect: Mood normal.         Behavior: Behavior normal.         Thought Content: Thought content normal.         Judgment: Judgment normal.         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  SVR 12 Hep C VL not detected 5/19/22  Long discussion regarding labs today. Patient has achieved SVR-12 (sustained virologic response) after 12 weeks of therapy with Epclusa which was well tolerated. Patient congratulated and advised to avoid situations which could cause reinfection. Pt voiced understanding. Also discussed the difference between the HCV RNA test and antibody testing. Hep C antibody will remain positive for life, but HCV RNA negativity is indicative of a cure. Patient very pleased with overall care and experience. All questions answered.   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  cirrhosis Counseling:  -strict abstinence of alcohol use  -low sodium (salt) 2000 mg per day  -Nutrition: 25-30 kcal (calorie per body weight in kilogram) per day  -High protein diet if no renal disease: 1.2-1.5 gram/kg (protein per body weight in kg) per day to prevent muscle mass loss  -Resistance exercises for muscle strength  -Avoid raw seafood due to risk of fatal Vibrio vulnificus infection  -Avoid Non-steroidal anti-inflammatory drugs (NSAIDs) such as Ibuprofen, Motrin, Naproxen, Aleve due to risk of kidney damage  -Can take acetaminophen(Tylenol), no more than 2000 mg  per day  -Compliance with all medications stressed  -Ultrasound or MRI of the liver every 6 months for liver cancer  screening  -Upper endoscopy every 1-2 years to screen for varices    Hypertension, unspecified type  Medication compliance stressed  BP goal <130/80. Monitor BP at home and keep log of readings.  Low sodium diet encouraged ; 2 grams Na diet  Weight control recommended   Avoid illicit drug use and excessive ETOH  Increase exercise activity ; 30 minutes of strenuous activity 3-5 x week    Hyperlipidemia, unspecified hyperlipidemia type  Encouraged to decrease intake of foods high in fat and sugar.  Continue Atorvastatin     Tobacco user  Smoking cessation encouraged.  Pt is not ready to quit.  Discussed benefits of quitting: improved overall health, decreased cardiac/vascular/pulmonary/stroke risks, as well as cost savings.

## 2022-05-26 NOTE — PROGRESS NOTES
Patient here for FibroScan visit. Reports NPO X3 hours and denies any implanted electronic devices. Position patient for the procedure to expose the right rib cage. Explained procedure to the patient. FibroScan exam complete and was tolerated without any problems.

## 2022-06-07 ENCOUNTER — IMMUNIZATION (OUTPATIENT)
Dept: PRIMARY CARE CLINIC | Facility: CLINIC | Age: 57
End: 2022-06-07
Payer: MEDICAID

## 2022-06-07 DIAGNOSIS — Z23 NEED FOR VACCINATION: Primary | ICD-10-CM

## 2022-06-07 PROCEDURE — 91305 COVID-19, MRNA, LNP-S, PF, 30 MCG/0.3 ML DOSE VACCINE (PFIZER): CPT | Mod: PBBFAC | Performed by: INTERNAL MEDICINE

## 2022-06-09 ENCOUNTER — HOSPITAL ENCOUNTER (OUTPATIENT)
Dept: RADIOLOGY | Facility: HOSPITAL | Age: 57
Discharge: HOME | End: 2022-06-09
Attending: NURSE PRACTITIONER
Payer: MEDICAID

## 2022-06-09 DIAGNOSIS — B18.2 CHRONIC HEPATITIS C WITHOUT HEPATIC COMA: ICD-10-CM

## 2022-06-09 DIAGNOSIS — K76.9 LESION OF LIVER GREATER THAN 1 CM IN DIAMETER: Primary | ICD-10-CM

## 2022-06-09 DIAGNOSIS — K74.69 COMPENSATED CIRRHOSIS RELATED TO HEPATITIS C VIRUS (HCV): ICD-10-CM

## 2022-06-09 DIAGNOSIS — K74.60 HEPATIC CIRRHOSIS, UNSPECIFIED HEPATIC CIRRHOSIS TYPE, UNSPECIFIED WHETHER ASCITES PRESENT: ICD-10-CM

## 2022-06-09 DIAGNOSIS — B19.20 COMPENSATED CIRRHOSIS RELATED TO HEPATITIS C VIRUS (HCV): ICD-10-CM

## 2022-06-09 DIAGNOSIS — K76.9 LIVER DISEASE, UNSPECIFIED: ICD-10-CM

## 2022-06-09 PROCEDURE — 76705 ECHO EXAM OF ABDOMEN: CPT | Mod: TC

## 2022-06-09 NOTE — PROGRESS NOTES
Reviewed US from 6/9/22.  Pt has a hyperechoic area by the dome of the liver adjacent to the hemidiaphragm which may represent a hemangioma which is a normal variant, but other imaging is needed to confirm.  Pt will need an MRI abd with and without contrast. I will order. Please contact patient with results and plan.

## 2022-06-10 ENCOUNTER — TELEPHONE (OUTPATIENT)
Dept: INFECTIOUS DISEASES | Facility: CLINIC | Age: 57
End: 2022-06-10
Payer: MEDICAID

## 2022-06-10 NOTE — TELEPHONE ENCOUNTER
----- Message from DM Jeff sent at 6/9/2022  3:17 PM CDT -----  Reviewed US from 6/9/22.  Pt has a hyperechoic area by the dome of the liver adjacent to the hemidiaphragm which may represent a hemangioma which is a normal variant, but other imaging is needed to confirm.  Pt will need an MRI abd with and without contrast. I will order. Please contact patient with results and plan.

## 2022-06-10 NOTE — TELEPHONE ENCOUNTER
Tried to call patient to inform him of u/s results and let him know of MRI appt date and time.  MRI-7-7-22 @ 0700 with 0630 arrival time.  Pt needs to be fasting for appt.

## 2022-06-21 ENCOUNTER — CLINICAL SUPPORT (OUTPATIENT)
Dept: INFECTIOUS DISEASES | Facility: CLINIC | Age: 57
End: 2022-06-21
Payer: MEDICAID

## 2022-06-21 DIAGNOSIS — Z23 NEED FOR VACCINATION: Primary | ICD-10-CM

## 2022-06-21 PROCEDURE — 90636 HEP A/HEP B VACC ADULT IM: CPT | Mod: PBBFAC

## 2022-06-21 PROCEDURE — 90471 IMMUNIZATION ADMIN: CPT | Mod: PBBFAC

## 2022-06-21 RX ADMIN — HEPATITIS B VACCINE (RECOMBINANT) 20 MCG: 20 INJECTION, SUSPENSION INTRAMUSCULAR at 01:06

## 2022-06-21 NOTE — PROGRESS NOTES
Patient came into clinic to receive Hep B #3. Injection given to right deltoid without difficulty. Patient tolerated well.

## 2022-08-08 RX ORDER — ATORVASTATIN CALCIUM 20 MG/1
TABLET, FILM COATED ORAL
Qty: 30 TABLET | Refills: 1 | Status: SHIPPED | OUTPATIENT
Start: 2022-08-08 | End: 2022-08-23 | Stop reason: SDUPTHER

## 2022-08-12 RX ORDER — LISINOPRIL 20 MG/1
20 TABLET ORAL DAILY
COMMUNITY
Start: 2022-05-29 | End: 2022-08-23 | Stop reason: SDUPTHER

## 2022-08-15 ENCOUNTER — OFFICE VISIT (OUTPATIENT)
Dept: GASTROENTEROLOGY | Facility: CLINIC | Age: 57
End: 2022-08-15
Payer: MEDICAID

## 2022-08-15 VITALS
TEMPERATURE: 98 F | DIASTOLIC BLOOD PRESSURE: 74 MMHG | WEIGHT: 168.38 LBS | HEART RATE: 77 BPM | RESPIRATION RATE: 16 BRPM | HEIGHT: 67 IN | OXYGEN SATURATION: 95 % | BODY MASS INDEX: 26.43 KG/M2 | SYSTOLIC BLOOD PRESSURE: 124 MMHG

## 2022-08-15 DIAGNOSIS — R93.5 ABNORMAL ABDOMINAL ULTRASOUND: ICD-10-CM

## 2022-08-15 DIAGNOSIS — K74.69 COMPENSATED CIRRHOSIS RELATED TO HEPATITIS C VIRUS (HCV): Primary | ICD-10-CM

## 2022-08-15 DIAGNOSIS — B19.20 COMPENSATED CIRRHOSIS RELATED TO HEPATITIS C VIRUS (HCV): ICD-10-CM

## 2022-08-15 DIAGNOSIS — K74.69 COMPENSATED CIRRHOSIS RELATED TO HEPATITIS C VIRUS (HCV): ICD-10-CM

## 2022-08-15 DIAGNOSIS — Z72.0 TOBACCO USE: Primary | ICD-10-CM

## 2022-08-15 DIAGNOSIS — F10.10 ALCOHOL ABUSE: ICD-10-CM

## 2022-08-15 DIAGNOSIS — B19.20 COMPENSATED CIRRHOSIS RELATED TO HEPATITIS C VIRUS (HCV): Primary | ICD-10-CM

## 2022-08-15 DIAGNOSIS — K76.9 LIVER DISEASE, UNSPECIFIED: ICD-10-CM

## 2022-08-15 PROCEDURE — 3074F SYST BP LT 130 MM HG: CPT | Mod: CPTII,,, | Performed by: NURSE PRACTITIONER

## 2022-08-15 PROCEDURE — 1160F PR REVIEW ALL MEDS BY PRESCRIBER/CLIN PHARMACIST DOCUMENTED: ICD-10-PCS | Mod: CPTII,,, | Performed by: NURSE PRACTITIONER

## 2022-08-15 PROCEDURE — 3008F PR BODY MASS INDEX (BMI) DOCUMENTED: ICD-10-PCS | Mod: CPTII,,, | Performed by: NURSE PRACTITIONER

## 2022-08-15 PROCEDURE — 1160F RVW MEDS BY RX/DR IN RCRD: CPT | Mod: CPTII,,, | Performed by: NURSE PRACTITIONER

## 2022-08-15 PROCEDURE — 3008F BODY MASS INDEX DOCD: CPT | Mod: CPTII,,, | Performed by: NURSE PRACTITIONER

## 2022-08-15 PROCEDURE — 99214 PR OFFICE/OUTPT VISIT, EST, LEVL IV, 30-39 MIN: ICD-10-PCS | Mod: S$PBB,,, | Performed by: NURSE PRACTITIONER

## 2022-08-15 PROCEDURE — 1159F PR MEDICATION LIST DOCUMENTED IN MEDICAL RECORD: ICD-10-PCS | Mod: CPTII,,, | Performed by: NURSE PRACTITIONER

## 2022-08-15 PROCEDURE — 4010F ACE/ARB THERAPY RXD/TAKEN: CPT | Mod: CPTII,,, | Performed by: NURSE PRACTITIONER

## 2022-08-15 PROCEDURE — 3074F PR MOST RECENT SYSTOLIC BLOOD PRESSURE < 130 MM HG: ICD-10-PCS | Mod: CPTII,,, | Performed by: NURSE PRACTITIONER

## 2022-08-15 PROCEDURE — 99214 OFFICE O/P EST MOD 30 MIN: CPT | Mod: S$PBB,,, | Performed by: NURSE PRACTITIONER

## 2022-08-15 PROCEDURE — 3078F PR MOST RECENT DIASTOLIC BLOOD PRESSURE < 80 MM HG: ICD-10-PCS | Mod: CPTII,,, | Performed by: NURSE PRACTITIONER

## 2022-08-15 PROCEDURE — 3078F DIAST BP <80 MM HG: CPT | Mod: CPTII,,, | Performed by: NURSE PRACTITIONER

## 2022-08-15 PROCEDURE — 1159F MED LIST DOCD IN RCRD: CPT | Mod: CPTII,,, | Performed by: NURSE PRACTITIONER

## 2022-08-15 PROCEDURE — 4010F PR ACE/ARB THEARPY RXD/TAKEN: ICD-10-PCS | Mod: CPTII,,, | Performed by: NURSE PRACTITIONER

## 2022-08-15 PROCEDURE — 99215 OFFICE O/P EST HI 40 MIN: CPT | Mod: PBBFAC | Performed by: NURSE PRACTITIONER

## 2022-08-15 NOTE — PROGRESS NOTES
Subjective:       Patient ID: Denis Ramon Jr. is a 57 y.o. male.    Chief Complaint: Cirrhosis    This 57-year-old  male with a history of hypertension, hyperlipidemia, CVA, and hepatitis-C is referred for cirrhosis.  He presents unaccompanied.  He reports feeling well and denies nocturnal symptoms, appetite changes, unintentional weight loss, fever, chills, nausea, vomiting, hematemesis, odynophagia, dysphagia, acid reflux, pyrosis, belching, bloating, early satiety, or abdominal pain.  Bowel habits are described as formed stools once daily without melena, hematochezia, fecal urgency, fecal incontinence, or pain with defecation.  He denies icterus, jaundice, pruritus, confusion, headaches, vision changes, cough, shortness of breath, chest pain, abdominal/lower extremity swelling, dark-colored urine, or pale-colored stools.    FibroScan November 8, 2021 revealed F4. FibroScan May 26, 2022 revealed S0 F2.    Abdominal ultrasound June 9, 2022 revealed hyperechoic area by the dome of the liver adjacent to the hemidiaphragm which is hyperechoic statistically might represent a hemangioma, however, other imaging modalities might prove helpful for further assessment to assess the lesion in a patient with history of hepatitis C and cirrhosis; persistent prominence of the common bile duct at 9.1 mm with no evidence of intrahepatic biliary ductal dilatation; the prominence of the extrahepatic common bile duct is nonspecific and of unknown clinical significance clinical correlation is suggested; no other abnormalities. MRI abdomen with and without contrast was ordered and not completed.    FIT testing negative May 19, 2022. He denies ever having an EGD or colonoscopy done. He takes aspirin 81 mg daily. He smokes 1 pack of cigarettes daily and drinks beer and liquor on occasion.  He denies a family history of IBD, colon polyps, or colon cancer.    Review of patient's allergies indicates:  No Known  Allergies    Past Medical History:   Diagnosis Date    Cirrhosis     HLD (hyperlipidemia)     Hypertension     Stroke        History reviewed. No pertinent surgical history.    Family History:   family history includes Breast cancer in his mother; Diabetes in his mother; Lung disease in his father; No Known Problems in his sister.    Social History:    reports that he has been smoking cigarettes. He has been smoking about 1.50 packs per day. He has never used smokeless tobacco. He reports current alcohol use of about 6.0 standard drinks of alcohol per week. He reports current drug use. Frequency: 7.00 times per week. Drug: Marijuana.    Review of Systems  Negative except as noted in the HPI.      Objective:      Physical Exam  Constitutional:       Appearance: Normal appearance.   HENT:      Head: Normocephalic.      Mouth/Throat:      Mouth: Mucous membranes are moist.   Eyes:      Extraocular Movements: Extraocular movements intact.      Conjunctiva/sclera: Conjunctivae normal.      Pupils: Pupils are equal, round, and reactive to light.   Cardiovascular:      Rate and Rhythm: Normal rate and regular rhythm.      Pulses: Normal pulses.      Heart sounds: Normal heart sounds.   Pulmonary:      Effort: Pulmonary effort is normal.      Breath sounds: Normal breath sounds.   Abdominal:      General: Bowel sounds are normal.      Palpations: Abdomen is soft.   Musculoskeletal:         General: Normal range of motion.      Cervical back: Normal range of motion and neck supple.   Skin:     General: Skin is warm and dry.   Neurological:      General: No focal deficit present.      Mental Status: He is alert and oriented to person, place, and time.   Psychiatric:         Mood and Affect: Mood normal.         Behavior: Behavior normal.         Thought Content: Thought content normal.         Judgment: Judgment normal.         Home Medications:     Current Outpatient Medications   Medication Sig    amLODIPine (NORVASC)  10 MG tablet TAKE 1 TABLET BY MOUTH EVERY DAY    atorvastatin (LIPITOR) 20 MG tablet TAKE 1 TABLET BY MOUTH DAILY    hydrALAZINE (APRESOLINE) 50 MG tablet Take 50 mg by mouth 2 (two) times daily.    lisinopriL (PRINIVIL,ZESTRIL) 20 MG tablet Take 20 mg by mouth once daily.    metoprolol tartrate (LOPRESSOR) 50 MG tablet Take 50 mg by mouth 2 (two) times daily.    aspirin (ECOTRIN) 81 MG EC tablet Take 81 mg by mouth once daily.     Imaging Results:     Narrative & Impression  EXAMINATION:  US ABDOMEN LIMITED_LIVER     CLINICAL HISTORY:  , Other cirrhosis of liver.     TECHNIQUE:  Transverse and longitudinal images of the right upper abdomen were obtained.     COMPARISON:  None     FINDINGS:  Liver:     Size: 14.2 cm in the right midclavicular line, normal     Appearance: Normal  echogenicity, smooth  contour     Mass: Small hyperechoic lesion identified by the dome of the liver measuring 2.6 x 8 mm x 4.1 cm statistically most likely representing a hemangioma, however, other imaging modalities might prove helpful for further assessment     Gallbladder:     Stones/Sludge: None     Appearance: No wall thickening, pericholecystic fluid or hydrops     Sonographic Jewell's Sign: Negative     Bile Ducts:     Intrahepatic Ducts: No dilatation     Extrahepatic Ducts: Common bile duct measures 0.91 cm, is prominent with no evidence of intrahepatic bile ductal dilatation     Pancreas:     Obscured by gas     Right Kidney:     Size: 10.7 cm in length     Echogenicity: Normal     Collecting System: No hydronephrosis     Stone: None     Cyst/Mass: None     Vessels:     Aorta: Visualized portions are normal. Inferior Vena Cava: Visualized portions are normal.     Main Portal Vein: Patent with hepatopedal  flow.     Free Fluid:     No ascites or pleural effusions     Impression:     Hyperechoic area by the dome of the liver adjacent to the hemidiaphragm which is hyperechoic statistically might represent a hemangioma, however,  other imaging modalities might prove helpful for further assessment to assess the lesion in a patient with history of hepatitis C and cirrhosis     Persistent prominence of the common bile duct at 9.1 mm with no evidence of intrahepatic biliary ductal dilatation.  The prominence of the extrahepatic common bile duct is nonspecific and of unknown clinical significance clinical correlation is suggested.     No other abnormalities        Electronically signed by: Leonardo Martinez  Date:                                            06/09/2022  Time:                                           10:44    Assessment/Plan:     Problem List Items Addressed This Visit        Psychiatric    Alcohol abuse     Recommend alcohol cessation.              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 C virus (HCV)     Background:  Alcohol: drinks beer and liquor on occasion; recommend cessation    Tobacco: smokes 1 pack of cigarettes per day; recommend cessation    Liver disease: hepatitis C     MELD-Na: 7  Child-Martínez Class: A    Transplant: not under evaluation    Cirrhosis is decompensated with:    Ascites: No   Spontaneous bacterial peritonitis: No  Hepatic Encephalopathy: No  Hepatocellular carcinoma: No  Hepatorenal syndrome: No  Hyponatremia: No  Muscle wasting: No  Portal vein thrombosis: No    Screening:  Last EGD: ordered today    Last imaging study: Abdominal ultrasound June 9, 2022 revealed hyperechoic area by the dome of the liver adjacent to the hemidiaphragm which is hyperechoic statistically might represent a hemangioma, however, other imaging modalities might prove helpful for further assessment to assess the lesion in a patient with history of hepatitis C and cirrhosis; persistent prominence of the common bile duct at 9.1 mm with no evidence of intrahepatic biliary ductal dilatation; the prominence of the extrahepatic common bile duct is nonspecific and of unknown clinical significance clinical correlation is  suggested; no other abnormalities; MRI ordered.    CIRRHOSIS COUNSELING:  - strict abstinence of alcohol use  - low sodium (salt) 2000mg per day  - Nutrition: 25-30kcal (calorie per body weight in kilogram) per day  - No need to restrict protein in diet  - High protein diet: 1.2-1.5 gram/kg (protein per body weight in kg) per day to prevent muscle mass loss  - Resistance exercises for muscle strength  - Avoid raw seafoods due to risk of fatal Vibrio vulnificus infection  - Avoid Non-steroidal anti-inflammatory drugs (NSAIDs) such as ibuprofen, Motrin, naproxen, Aleve due to risk of kidney damage  - Can take acetaminophen (Tylenol), no more than 2000mg per day  - Compliance with all medications  - Ultrasound or MRI of the liver every 6 months for liver cancer screening  - Upper endoscopy every 1-2 years to screen for varices    Vaccinations:   Yearly Flu: 11/8/21  Prevnar: needs  Pneumovax: needs  Covid: 6/7/22, 3/3/22, 9/3/21, 8/13/21  Hepatitis A: 11/8/21  Hepatitis B: 6/21/22, 1/20/22, 12/21/21  Shingles: needs   Tdap: 1/20/22    DEXA scan: will need    CBC, CMP, PT/INR, AFP  MRI abdomen with and without contrast  FibroScan repeat in November 2022  EGD for variceal screening  Call with updates  F/u clinic visit with NP in 6 months           Relevant Orders    Case Request Endoscopy: EGD (ESOPHAGOGASTRODUODENOSCOPY) (Completed)    CBC Auto Differential    Comprehensive Metabolic Panel    Protime-INR    AFP Tumor Marker    MRI Abdomen W WO Contrast    Abnormal abdominal ultrasound     See above           Relevant Orders    MRI Abdomen W WO Contrast       Other    Tobacco use - Primary     Recommend tobacco cessation.           Relevant Orders    Ambulatory referral/consult to Smoking Cessation Program      Other Visit Diagnoses     Liver disease, unspecified

## 2022-08-15 NOTE — ASSESSMENT & PLAN NOTE
Background:  Alcohol: drinks beer and liquor on occasion; recommend cessation    Tobacco: smokes 1 pack of cigarettes per day; recommend cessation    Liver disease: hepatitis C     MELD-Na: 7  Child-Martínez Class: A    Transplant: not under evaluation    Cirrhosis is decompensated with:    Ascites: No   Spontaneous bacterial peritonitis: No  Hepatic Encephalopathy: No  Hepatocellular carcinoma: No  Hepatorenal syndrome: No  Hyponatremia: No  Muscle wasting: No  Portal vein thrombosis: No    Screening:  Last EGD: ordered today    Last imaging study: Abdominal ultrasound June 9, 2022 revealed hyperechoic area by the dome of the liver adjacent to the hemidiaphragm which is hyperechoic statistically might represent a hemangioma, however, other imaging modalities might prove helpful for further assessment to assess the lesion in a patient with history of hepatitis C and cirrhosis; persistent prominence of the common bile duct at 9.1 mm with no evidence of intrahepatic biliary ductal dilatation; the prominence of the extrahepatic common bile duct is nonspecific and of unknown clinical significance clinical correlation is suggested; no other abnormalities; MRI ordered.    CIRRHOSIS COUNSELING:  - strict abstinence of alcohol use  - low sodium (salt) 2000mg per day  - Nutrition: 25-30kcal (calorie per body weight in kilogram) per day  - No need to restrict protein in diet  - High protein diet: 1.2-1.5 gram/kg (protein per body weight in kg) per day to prevent muscle mass loss  - Resistance exercises for muscle strength  - Avoid raw seafoods due to risk of fatal Vibrio vulnificus infection  - Avoid Non-steroidal anti-inflammatory drugs (NSAIDs) such as ibuprofen, Motrin, naproxen, Aleve due to risk of kidney damage  - Can take acetaminophen (Tylenol), no more than 2000mg per day  - Compliance with all medications  - Ultrasound or MRI of the liver every 6 months for liver cancer screening  - Upper endoscopy every 1-2 years to  screen for varices    Vaccinations:   Yearly Flu: 11/8/21  Prevnar: needs  Pneumovax: needs  Covid: 6/7/22, 3/3/22, 9/3/21, 8/13/21  Hepatitis A: 11/8/21  Hepatitis B: 6/21/22, 1/20/22, 12/21/21  Shingles: needs   Tdap: 1/20/22    DEXA scan: will need    CBC, CMP, PT/INR, AFP  MRI abdomen with and without contrast  FibroScan repeat in November 2022  EGD for variceal screening  Call with updates  F/u clinic visit with NP in 6 months

## 2022-08-16 ENCOUNTER — LAB VISIT (OUTPATIENT)
Dept: LAB | Facility: HOSPITAL | Age: 57
End: 2022-08-16
Attending: NURSE PRACTITIONER
Payer: MEDICAID

## 2022-08-16 DIAGNOSIS — K74.69 COMPENSATED CIRRHOSIS RELATED TO HEPATITIS C VIRUS (HCV): ICD-10-CM

## 2022-08-16 DIAGNOSIS — B19.20 COMPENSATED CIRRHOSIS RELATED TO HEPATITIS C VIRUS (HCV): ICD-10-CM

## 2022-08-16 LAB
AFP-TM SERPL-MCNC: 2.7 NG/ML
ALBUMIN SERPL-MCNC: 3.9 GM/DL (ref 3.5–5)
ALBUMIN/GLOB SERPL: 1.1 RATIO (ref 1.1–2)
ALP SERPL-CCNC: 69 UNIT/L (ref 40–150)
ALT SERPL-CCNC: 37 UNIT/L (ref 0–55)
AST SERPL-CCNC: 39 UNIT/L (ref 5–34)
BASOPHILS # BLD AUTO: 0.03 X10(3)/MCL (ref 0–0.2)
BASOPHILS NFR BLD AUTO: 0.3 %
BILIRUBIN DIRECT+TOT PNL SERPL-MCNC: 1.4 MG/DL
BUN SERPL-MCNC: 6.1 MG/DL (ref 8.4–25.7)
CALCIUM SERPL-MCNC: 9.7 MG/DL (ref 8.4–10.2)
CHLORIDE SERPL-SCNC: 101 MMOL/L (ref 98–107)
CO2 SERPL-SCNC: 31 MMOL/L (ref 22–29)
CREAT SERPL-MCNC: 0.89 MG/DL (ref 0.73–1.18)
EOSINOPHIL # BLD AUTO: 0.45 X10(3)/MCL (ref 0–0.9)
EOSINOPHIL NFR BLD AUTO: 4.6 %
ERYTHROCYTE [DISTWIDTH] IN BLOOD BY AUTOMATED COUNT: 11.8 % (ref 11.5–17)
GFR SERPLBLD CREATININE-BSD FMLA CKD-EPI: >60 MLS/MIN/1.73/M2
GLOBULIN SER-MCNC: 3.6 GM/DL (ref 2.4–3.5)
GLUCOSE SERPL-MCNC: 143 MG/DL (ref 74–100)
HCT VFR BLD AUTO: 46.9 % (ref 42–52)
HGB BLD-MCNC: 16.3 GM/DL (ref 14–18)
IMM GRANULOCYTES # BLD AUTO: 0.03 X10(3)/MCL (ref 0–0.04)
IMM GRANULOCYTES NFR BLD AUTO: 0.3 %
LYMPHOCYTES # BLD AUTO: 3.75 X10(3)/MCL (ref 0.6–4.6)
LYMPHOCYTES NFR BLD AUTO: 38.5 %
MCH RBC QN AUTO: 33 PG (ref 27–31)
MCHC RBC AUTO-ENTMCNC: 34.8 MG/DL (ref 33–36)
MCV RBC AUTO: 94.9 FL (ref 80–94)
MONOCYTES # BLD AUTO: 1.32 X10(3)/MCL (ref 0.1–1.3)
MONOCYTES NFR BLD AUTO: 13.5 %
NEUTROPHILS # BLD AUTO: 4.2 X10(3)/MCL (ref 2.1–9.2)
NEUTROPHILS NFR BLD AUTO: 42.8 %
NRBC BLD AUTO-RTO: 0 %
PLATELET # BLD AUTO: 208 X10(3)/MCL (ref 130–400)
PMV BLD AUTO: 9.2 FL (ref 7.4–10.4)
POTASSIUM SERPL-SCNC: 3.2 MMOL/L (ref 3.5–5.1)
PROT SERPL-MCNC: 7.5 GM/DL (ref 6.4–8.3)
RBC # BLD AUTO: 4.94 X10(6)/MCL (ref 4.7–6.1)
SODIUM SERPL-SCNC: 141 MMOL/L (ref 136–145)
WBC # SPEC AUTO: 9.8 X10(3)/MCL (ref 4.5–11.5)

## 2022-08-16 PROCEDURE — 85610 PROTHROMBIN TIME: CPT

## 2022-08-16 PROCEDURE — 80053 COMPREHEN METABOLIC PANEL: CPT

## 2022-08-16 PROCEDURE — 82105 ALPHA-FETOPROTEIN SERUM: CPT

## 2022-08-16 PROCEDURE — 85025 COMPLETE CBC W/AUTO DIFF WBC: CPT

## 2022-08-16 PROCEDURE — 36415 COLL VENOUS BLD VENIPUNCTURE: CPT

## 2022-08-17 ENCOUNTER — TELEPHONE (OUTPATIENT)
Dept: GASTROENTEROLOGY | Facility: CLINIC | Age: 57
End: 2022-08-17
Payer: MEDICAID

## 2022-08-17 NOTE — PROGRESS NOTES
Please notify AFP and clotting factors are within normal limits. CBC unremarkable. Glucose elevated and potassium decreased. I would recommend increase in potassium rich foods and defer to PCP for further monitoring. Thanks

## 2022-08-17 NOTE — TELEPHONE ENCOUNTER
----- Message from DM Head sent at 8/17/2022 10:12 AM CDT -----  Please notify AFP and clotting factors are within normal limits. CBC unremarkable. Glucose elevated and potassium decreased. I would recommend increase in potassium rich foods and defer to PCP for further monitoring. Thanks

## 2022-08-17 NOTE — LETTER
August 22, 2022    Denis Yenny Patiño  [address] [place] [address] [facility]  [address] [facility] [address]  Phone: [phone] Dear Mr. Ramon:    Our clinic has been trying to reach you. Please call the clinic at [phone].    If you have any questions or concerns, please don't hesitate to call.    Sincerely,        Rhina Chou LPN

## 2022-08-23 ENCOUNTER — OFFICE VISIT (OUTPATIENT)
Dept: FAMILY MEDICINE | Facility: CLINIC | Age: 57
End: 2022-08-23
Payer: MEDICAID

## 2022-08-23 VITALS
DIASTOLIC BLOOD PRESSURE: 69 MMHG | TEMPERATURE: 98 F | OXYGEN SATURATION: 99 % | HEART RATE: 65 BPM | RESPIRATION RATE: 18 BRPM | HEIGHT: 67 IN | SYSTOLIC BLOOD PRESSURE: 111 MMHG | BODY MASS INDEX: 26.34 KG/M2 | WEIGHT: 167.81 LBS

## 2022-08-23 DIAGNOSIS — Z76.0 ENCOUNTER FOR MEDICATION REFILL: Primary | ICD-10-CM

## 2022-08-23 PROBLEM — F17.200 TOBACCO DEPENDENCE: Status: ACTIVE | Noted: 2022-08-23

## 2022-08-23 PROBLEM — I10 HYPERTENSION: Status: ACTIVE | Noted: 2022-08-23

## 2022-08-23 PROBLEM — E78.2 MIXED HYPERLIPIDEMIA: Status: ACTIVE | Noted: 2022-08-23

## 2022-08-23 PROCEDURE — 99213 PR OFFICE/OUTPT VISIT, EST, LEVL III, 20-29 MIN: ICD-10-PCS | Mod: S$PBB,,, | Performed by: NURSE PRACTITIONER

## 2022-08-23 PROCEDURE — 3008F BODY MASS INDEX DOCD: CPT | Mod: CPTII,,, | Performed by: NURSE PRACTITIONER

## 2022-08-23 PROCEDURE — 99213 OFFICE O/P EST LOW 20 MIN: CPT | Mod: S$PBB,,, | Performed by: NURSE PRACTITIONER

## 2022-08-23 PROCEDURE — 3008F PR BODY MASS INDEX (BMI) DOCUMENTED: ICD-10-PCS | Mod: CPTII,,, | Performed by: NURSE PRACTITIONER

## 2022-08-23 PROCEDURE — 1159F MED LIST DOCD IN RCRD: CPT | Mod: CPTII,,, | Performed by: NURSE PRACTITIONER

## 2022-08-23 PROCEDURE — 3078F DIAST BP <80 MM HG: CPT | Mod: CPTII,,, | Performed by: NURSE PRACTITIONER

## 2022-08-23 PROCEDURE — 4010F ACE/ARB THERAPY RXD/TAKEN: CPT | Mod: CPTII,,, | Performed by: NURSE PRACTITIONER

## 2022-08-23 PROCEDURE — 3078F PR MOST RECENT DIASTOLIC BLOOD PRESSURE < 80 MM HG: ICD-10-PCS | Mod: CPTII,,, | Performed by: NURSE PRACTITIONER

## 2022-08-23 PROCEDURE — 1160F RVW MEDS BY RX/DR IN RCRD: CPT | Mod: CPTII,,, | Performed by: NURSE PRACTITIONER

## 2022-08-23 PROCEDURE — 1159F PR MEDICATION LIST DOCUMENTED IN MEDICAL RECORD: ICD-10-PCS | Mod: CPTII,,, | Performed by: NURSE PRACTITIONER

## 2022-08-23 PROCEDURE — 99214 OFFICE O/P EST MOD 30 MIN: CPT | Mod: PBBFAC | Performed by: NURSE PRACTITIONER

## 2022-08-23 PROCEDURE — 1160F PR REVIEW ALL MEDS BY PRESCRIBER/CLIN PHARMACIST DOCUMENTED: ICD-10-PCS | Mod: CPTII,,, | Performed by: NURSE PRACTITIONER

## 2022-08-23 PROCEDURE — 3074F SYST BP LT 130 MM HG: CPT | Mod: CPTII,,, | Performed by: NURSE PRACTITIONER

## 2022-08-23 PROCEDURE — 4010F PR ACE/ARB THEARPY RXD/TAKEN: ICD-10-PCS | Mod: CPTII,,, | Performed by: NURSE PRACTITIONER

## 2022-08-23 PROCEDURE — 3074F PR MOST RECENT SYSTOLIC BLOOD PRESSURE < 130 MM HG: ICD-10-PCS | Mod: CPTII,,, | Performed by: NURSE PRACTITIONER

## 2022-08-23 RX ORDER — LISINOPRIL 20 MG/1
20 TABLET ORAL DAILY
Qty: 90 TABLET | Refills: 3 | Status: SHIPPED | OUTPATIENT
Start: 2022-08-23 | End: 2023-01-09 | Stop reason: SDUPTHER

## 2022-08-23 RX ORDER — METOPROLOL TARTRATE 50 MG/1
50 TABLET ORAL 2 TIMES DAILY
Qty: 60 TABLET | Refills: 3 | Status: SHIPPED | OUTPATIENT
Start: 2022-08-23 | End: 2023-01-09 | Stop reason: SDUPTHER

## 2022-08-23 RX ORDER — ATORVASTATIN CALCIUM 20 MG/1
20 TABLET, FILM COATED ORAL DAILY
Qty: 90 TABLET | Refills: 1 | Status: SHIPPED | OUTPATIENT
Start: 2022-08-23 | End: 2023-01-09 | Stop reason: SDUPTHER

## 2022-08-23 RX ORDER — HYDRALAZINE HYDROCHLORIDE 50 MG/1
50 TABLET, FILM COATED ORAL 2 TIMES DAILY
Qty: 60 TABLET | Refills: 3 | Status: SHIPPED | OUTPATIENT
Start: 2022-08-23 | End: 2023-01-09 | Stop reason: SDUPTHER

## 2022-08-23 NOTE — ASSESSMENT & PLAN NOTE
"Medication refills initiated.  Blood pressure 111/69, pulse 65 beats per minute.  Hydralazine 50 mg p.o. b.i.d..  Lisinopril 20 mg p.o. once daily.  Metoprolol 50 mg p.o. b.i.d.  Lipitor 20 mg p.o. at bedtime.  Norvasc 10 mg patient still have 2 more refills, patient will call when ready to refill.  Encouraged exercise for 30 minutes 5 days per week.  Low-cholesterol, low-fat diet.  Declined smoking cessation and alcohol,"not ready".  "

## 2022-08-23 NOTE — PROGRESS NOTES
Patient Name: Denis Ramon Jr.   : 1965  MRN: [id number]     SUBJECTIVE:  Denis Ramon Jr. is a 57 y.o. male here for Follow-up (Previous pt of Dr. AMADOR) and Establish Care  .    57-year-old male presents to the clinic requesting medication refills on his amlodipine, lisinopril, metoprolol, hydralazine and Lipitor.  Patient compliant with his medications.  Patient stated he has a endoscopy scheduled in 2022.  Patient declined colonoscopy, declined Cologuard, declined pneumococcal vaccine and shingles vaccine for now.  Patient will come back next year in 2023 for fasting blood work and wellness exam.  Patient not ready to quit smoking nor stop drinking alcohol despite education on smoking and alcohol hazard.  Patient states he is keeping up his follow-up appointment with other providers.      Patient denies chest pain, shortness of breath, dyspnea on exertion, palpitations, peripheral edema, abdominal pain, nausea, vomiting, diarrhea, constipation, fatigue, fever, chills, dysuria,  hematuria, melena, or hematochezia.            Visit on 2022  56 Years Male presents to [facility] in for follow up for hypertension and medication refill pt has past medical history of HTN, Rt cerebellar infarct  (CVA) 2020, elevated liver enzymes, hepatic steatosis Hep C  followed by ID and alcoholism.  Discussed with patient labs from 2022: WNL  Patient states that he is adhering to a low fat, low sodium, and low carbohydrate eating habit.  Patient states that he is compliant with his medication.  Alcoholism: RESOLVED: Pt has been sobber since 2021; currently states he drinks one beer during [employer] playoffs.  Hep C: Saw ID yesterday. Completed Epclusa for 12 wks. HCV RNA nondetected.  HTN:  Controlled today on amlodipine 10 mg po daily, lisinopril 20 mg po daily,metoprolol tartrate 50 mg po daily and hydralazine 50 mg po BID.  CVA with LE sequalae: Completed  PT at [facility]. Pt  having some issues with  "balance with LE and slow gait.  Patient denies chest pain, shortness of breath, dyspnea on exertion, palpitations, peripheral edema, abdominal pain, nausea, vomiting, diarrhea, constipation, fatigue, fever, chills, dysuria,  hematuria, melena, or hematochezia.        ALLERGIES: Review of patient's allergies indicates:  No Known Allergies      ROS:  Review of Systems   All other systems reviewed and are negative.        OBJECTIVE:  Vital signs  Vitals:    08/23/22 0856   BP: 111/69   BP Location: Right arm   Patient Position: Sitting   Pulse: 65   Resp: 18   Temp: 97.9 °F (36.6 °C)   SpO2: 99%   Weight: 76.1 kg (167 lb 12.8 oz)   Height: 5' 7" (1.702 m)      Body mass index is 26.28 kg/m².    PHYSICAL EXAM:   Physical Exam  Vitals and nursing note reviewed.   Constitutional:       General: He is not in acute distress.     Appearance: Normal appearance. He is not ill-appearing, toxic-appearing or diaphoretic.   HENT:      Head: Atraumatic.   Eyes:      Extraocular Movements: Extraocular movements intact.      Pupils: Pupils are equal, round, and reactive to light.      Comments: Jaundice, has liver issues.   Cardiovascular:      Rate and Rhythm: Normal rate and regular rhythm.      Pulses: Normal pulses.      Heart sounds: Normal heart sounds. No murmur heard.  Pulmonary:      Effort: Pulmonary effort is normal.      Breath sounds: Normal breath sounds.   Abdominal:      Palpations: Abdomen is soft.      Tenderness: There is no abdominal tenderness.   Musculoskeletal:         General: Normal range of motion.      Cervical back: Normal range of motion and neck supple. No rigidity or tenderness.   Lymphadenopathy:      Cervical: No cervical adenopathy.   Skin:     Capillary Refill: Capillary refill takes less than 2 seconds.   Neurological:      General: No focal deficit present.      Mental Status: He is alert and oriented to person, place, and time. Mental status is at baseline.      Cranial Nerves: No cranial nerve " "deficit.      Motor: No weakness.      Coordination: Coordination normal.      Gait: Gait normal.   Psychiatric:         Mood and Affect: Mood normal.         Behavior: Behavior normal.         Thought Content: Thought content normal.         Judgment: Judgment normal.          ASSESSMENT/PLAN:  1. Encounter for medication refill  Assessment & Plan:  Medication refills initiated.  Blood pressure 111/69, pulse 65 beats per minute.  Hydralazine 50 mg p.o. b.i.d..  Lisinopril 20 mg p.o. once daily.  Metoprolol 50 mg p.o. b.i.d.  Lipitor 20 mg p.o. at bedtime.  Norvasc 10 mg patient still have 2 more refills, patient will call when ready to refill.  Encouraged exercise for 30 minutes 5 days per week.  Low-cholesterol, low-fat diet.  Declined smoking cessation and alcohol,"not ready".    Orders:  -     hydrALAZINE (APRESOLINE) 50 MG tablet  -     metoprolol tartrate (LOPRESSOR) 50 MG tablet  -     atorvastatin (LIPITOR) 20 MG tablet  -     lisinopriL (PRINIVIL,ZESTRIL) 20 MG tablet         RESULTS:  Recent Results (from the past 1008 hour(s))   Comprehensive Metabolic Panel    Collection Time: 08/16/22  6:57 AM   Result Value Ref Range    Sodium Level 141 136 - 145 mmol/L    Potassium Level 3.2 (L) 3.5 - 5.1 mmol/L    Chloride 101 98 - 107 mmol/L    Carbon Dioxide 31 (H) 22 - 29 mmol/L    Glucose Level 143 (H) 74 - 100 mg/dL    Blood Urea Nitrogen 6.1 (L) 8.4 - 25.7 mg/dL    Creatinine 0.89 0.73 - 1.18 mg/dL    Calcium Level Total 9.7 8.4 - 10.2 mg/dL    Protein Total 7.5 6.4 - 8.3 gm/dL    Albumin Level 3.9 3.5 - 5.0 gm/dL    Globulin 3.6 (H) 2.4 - 3.5 gm/dL    Albumin/Globulin Ratio 1.1 1.1 - 2.0 ratio    Bilirubin Total 1.4 <=1.5 mg/dL    Alkaline Phosphatase 69 40 - 150 unit/L    Alanine Aminotransferase 37 0 - 55 unit/L    Aspartate Aminotransferase 39 (H) 5 - 34 unit/L    eGFR >60 mls/min/1.73/m2   Protime-INR    Collection Time: 08/16/22  6:57 AM   Result Value Ref Range    PT 13.5 seconds    INR 1.05 0.00 - 1.30 "   AFP Tumor Marker    Collection Time: 08/16/22  6:57 AM   Result Value Ref Range    Alpha Fetoprotein Level 2.70 <=8.90 ng/mL   CBC with Differential    Collection Time: 08/16/22  6:57 AM   Result Value Ref Range    WBC 9.8 4.5 - 11.5 x10(3)/mcL    RBC 4.94 4.70 - 6.10 x10(6)/mcL    Hgb 16.3 14.0 - 18.0 gm/dL    Hct 46.9 42.0 - 52.0 %    MCV 94.9 (H) 80.0 - 94.0 fL    MCH 33.0 (H) 27.0 - 31.0 pg    MCHC 34.8 33.0 - 36.0 mg/dL    RDW 11.8 11.5 - 17.0 %    Platelet 208 130 - 400 x10(3)/mcL    MPV 9.2 7.4 - 10.4 fL    Neut % 42.8 %    Lymph % 38.5 %    Mono % 13.5 %    Eos % 4.6 %    Basophil % 0.3 %    Lymph # 3.75 0.6 - 4.6 x10(3)/mcL    Neut # 4.2 2.1 - 9.2 x10(3)/mcL    Mono # 1.32 (H) 0.1 - 1.3 x10(3)/mcL    Eos # 0.45 0 - 0.9 x10(3)/mcL    Baso # 0.03 0 - 0.2 x10(3)/mcL    IG# 0.03 0 - 0.04 x10(3)/mcL    IG% 0.3 %    NRBC% 0.0 %         Follow Up:  Follow up in about 23 weeks (around 1/31/2023).         This note was created with the assistance of a voice recognition software or phone dictation. There may be transcription errors as a result of using this technology however minimal. Effort has been made to assure accuracy of transcription but any obvious errors or omissions should be clarified with the author of the document

## 2022-08-31 DIAGNOSIS — K74.69 COMPENSATED CIRRHOSIS RELATED TO HEPATITIS C VIRUS (HCV): Primary | ICD-10-CM

## 2022-08-31 DIAGNOSIS — B19.20 COMPENSATED CIRRHOSIS RELATED TO HEPATITIS C VIRUS (HCV): Primary | ICD-10-CM

## 2022-09-21 ENCOUNTER — TELEPHONE (OUTPATIENT)
Dept: GASTROENTEROLOGY | Facility: CLINIC | Age: 57
End: 2022-09-21
Payer: MEDICAID

## 2022-10-12 ENCOUNTER — ANESTHESIA EVENT (OUTPATIENT)
Dept: ENDOSCOPY | Facility: HOSPITAL | Age: 57
End: 2022-10-12
Payer: MEDICAID

## 2022-10-12 NOTE — ANESTHESIA PREPROCEDURE EVALUATION
10/12/2022  Denis Ramon Jr. is a 57 y.o., male  For EGD ; history of compensated cirrhosis and Hep C , heavy smoker, HTN, Hyperlipidemia, history of Right Cerebellar Infarct Sept 2020         Past Surgical History:   Procedure Laterality Date    BRAIN SURGERY         Active Ambulatory Problems     Diagnosis Date Noted    Compensated cirrhosis related to hepatitis C virus (HCV) 08/15/2022    Abnormal abdominal ultrasound 08/15/2022    Tobacco use 08/15/2022    Alcohol abuse 08/15/2022    Hypertension 08/23/2022    Mixed hyperlipidemia 08/23/2022    Tobacco dependence 08/23/2022    Encounter for medication refill 08/23/2022     Resolved Ambulatory Problems     Diagnosis Date Noted    No Resolved Ambulatory Problems     Past Medical History:   Diagnosis Date    Cirrhosis     HLD (hyperlipidemia)     Stroke      .      Pre-op Assessment    I have reviewed the Patient Summary Reports.     I have reviewed the Nursing Notes. I have reviewed the NPO Status.   I have reviewed the Medications.     Review of Systems  Anesthesia Hx:  No problems with previous Anesthesia  History of prior surgery of interest to airway management or planning: Denies Family Hx of Anesthesia complications.   Denies Personal Hx of Anesthesia complications.   Hematology/Oncology:  Hematology Normal   Oncology Normal     EENT/Dental:EENT/Dental Normal   Cardiovascular:   Exercise tolerance: good Hypertension    Pulmonary:  Pulmonary Normal    Renal/:  Renal/ Normal     Hepatic/GI:   Liver Disease, Hepatitis, D    Musculoskeletal:  Musculoskeletal Normal    Neurological:   CVA    Endocrine:  Endocrine Normal    Dermatological:  Skin Normal    Psych:  Psychiatric Normal           Physical Exam  General: Cooperative, Well nourished, Alert and Oriented    Airway:  Mallampati: III / III  Mouth Opening: Small, but > 3cm  TM Distance:  Normal  Tongue: Normal  Neck ROM: Normal ROM    Dental:  Periodontal disease, Caps / Implants, Edentulous        Anesthesia Plan  Type of Anesthesia, risks & benefits discussed:    Anesthesia Type: MAC  Intra-op Monitoring Plan: Standard ASA Monitors  Post Op Pain Control Plan: multimodal analgesia  Induction:  IV  Airway Plan: Direct  Informed Consent: Informed consent signed with the Patient and all parties understand the risks and agree with anesthesia plan.  All questions answered. Patient consented to blood products? Yes  ASA Score: 3  Day of Surgery Review of History & Physical: H&P Update referred to the surgeon/provider.    Ready For Surgery From Anesthesia Perspective.     .

## 2022-10-13 ENCOUNTER — ANESTHESIA (OUTPATIENT)
Dept: ENDOSCOPY | Facility: HOSPITAL | Age: 57
End: 2022-10-13
Payer: MEDICAID

## 2022-10-13 ENCOUNTER — HOSPITAL ENCOUNTER (OUTPATIENT)
Facility: HOSPITAL | Age: 57
Discharge: HOME | End: 2022-10-13
Attending: INTERNAL MEDICINE | Admitting: INTERNAL MEDICINE
Payer: MEDICAID

## 2022-10-13 VITALS
OXYGEN SATURATION: 97 % | TEMPERATURE: 98 F | SYSTOLIC BLOOD PRESSURE: 131 MMHG | WEIGHT: 163.56 LBS | HEIGHT: 68 IN | BODY MASS INDEX: 24.79 KG/M2 | RESPIRATION RATE: 18 BRPM | HEART RATE: 58 BPM | DIASTOLIC BLOOD PRESSURE: 89 MMHG

## 2022-10-13 DIAGNOSIS — K74.69 COMPENSATED CIRRHOSIS RELATED TO HEPATITIS C VIRUS (HCV): Primary | ICD-10-CM

## 2022-10-13 DIAGNOSIS — B19.20 COMPENSATED CIRRHOSIS RELATED TO HEPATITIS C VIRUS (HCV): Primary | ICD-10-CM

## 2022-10-13 DIAGNOSIS — K29.80 DUODENITIS: ICD-10-CM

## 2022-10-13 PROCEDURE — 43239 EGD BIOPSY SINGLE/MULTIPLE: CPT | Performed by: INTERNAL MEDICINE

## 2022-10-13 PROCEDURE — 63600175 PHARM REV CODE 636 W HCPCS: Performed by: SPECIALIST

## 2022-10-13 PROCEDURE — 00731 ANES UPR GI NDSC PX NOS: CPT | Performed by: INTERNAL MEDICINE

## 2022-10-13 PROCEDURE — 37000008 HC ANESTHESIA 1ST 15 MINUTES: Performed by: INTERNAL MEDICINE

## 2022-10-13 PROCEDURE — 43239 PR EGD, FLEX, W/BIOPSY, SGL/MULTI: ICD-10-PCS | Mod: ,,, | Performed by: INTERNAL MEDICINE

## 2022-10-13 PROCEDURE — 25000242 PHARM REV CODE 250 ALT 637 W/ HCPCS: Performed by: SPECIALIST

## 2022-10-13 PROCEDURE — 43239 EGD BIOPSY SINGLE/MULTIPLE: CPT | Mod: ,,, | Performed by: INTERNAL MEDICINE

## 2022-10-13 PROCEDURE — 25000003 PHARM REV CODE 250: Performed by: SPECIALIST

## 2022-10-13 PROCEDURE — 25000003 PHARM REV CODE 250: Performed by: NURSE ANESTHETIST, CERTIFIED REGISTERED

## 2022-10-13 PROCEDURE — 63600175 PHARM REV CODE 636 W HCPCS: Performed by: NURSE ANESTHETIST, CERTIFIED REGISTERED

## 2022-10-13 PROCEDURE — 88305 TISSUE EXAM BY PATHOLOGIST: CPT | Performed by: INTERNAL MEDICINE

## 2022-10-13 PROCEDURE — 27201423 OPTIME MED/SURG SUP & DEVICES STERILE SUPPLY: Performed by: INTERNAL MEDICINE

## 2022-10-13 PROCEDURE — 37000009 HC ANESTHESIA EA ADD 15 MINS: Performed by: INTERNAL MEDICINE

## 2022-10-13 PROCEDURE — 94760 N-INVAS EAR/PLS OXIMETRY 1: CPT

## 2022-10-13 RX ORDER — LIDOCAINE HYDROCHLORIDE 20 MG/ML
INJECTION, SOLUTION EPIDURAL; INFILTRATION; INTRACAUDAL; PERINEURAL
Status: DISCONTINUED | OUTPATIENT
Start: 2022-10-13 | End: 2022-10-13

## 2022-10-13 RX ORDER — SODIUM CHLORIDE, SODIUM LACTATE, POTASSIUM CHLORIDE, CALCIUM CHLORIDE 600; 310; 30; 20 MG/100ML; MG/100ML; MG/100ML; MG/100ML
INJECTION, SOLUTION INTRAVENOUS CONTINUOUS
Status: DISCONTINUED | OUTPATIENT
Start: 2022-10-13 | End: 2022-10-13 | Stop reason: HOSPADM

## 2022-10-13 RX ORDER — PROPOFOL 10 MG/ML
VIAL (ML) INTRAVENOUS
Status: DISCONTINUED | OUTPATIENT
Start: 2022-10-13 | End: 2022-10-13

## 2022-10-13 RX ORDER — IPRATROPIUM BROMIDE AND ALBUTEROL SULFATE 2.5; .5 MG/3ML; MG/3ML
3 SOLUTION RESPIRATORY (INHALATION) ONCE
Status: COMPLETED | OUTPATIENT
Start: 2022-10-13 | End: 2022-10-13

## 2022-10-13 RX ORDER — METOPROLOL TARTRATE 25 MG/1
50 TABLET, FILM COATED ORAL 2 TIMES DAILY
Status: DISCONTINUED | OUTPATIENT
Start: 2022-10-13 | End: 2022-10-13 | Stop reason: HOSPADM

## 2022-10-13 RX ADMIN — SODIUM CHLORIDE, POTASSIUM CHLORIDE, SODIUM LACTATE AND CALCIUM CHLORIDE: 600; 310; 30; 20 INJECTION, SOLUTION INTRAVENOUS at 08:10

## 2022-10-13 RX ADMIN — METOPROLOL TARTRATE 50 MG: 25 TABLET, FILM COATED ORAL at 08:10

## 2022-10-13 RX ADMIN — LIDOCAINE HYDROCHLORIDE 40 MG: 20 INJECTION, SOLUTION EPIDURAL; INFILTRATION; INTRACAUDAL; PERINEURAL at 08:10

## 2022-10-13 RX ADMIN — PROPOFOL 50 MG: 10 INJECTION, EMULSION INTRAVENOUS at 08:10

## 2022-10-13 RX ADMIN — PROPOFOL 100 MG: 10 INJECTION, EMULSION INTRAVENOUS at 08:10

## 2022-10-13 RX ADMIN — IPRATROPIUM BROMIDE AND ALBUTEROL SULFATE 3 ML: 2.5; .5 SOLUTION RESPIRATORY (INHALATION) at 08:10

## 2022-10-13 NOTE — DISCHARGE SUMMARY
[facility] - Endoscopy  Discharge Note  Short Stay    Procedure(s) (LRB):  EGD, WITH CLOSED BIOPSY (N/A)    Procedure of EGD was explained to the patient and consent obtained.  The procedure was performed to screen for esophageal varices.  Patient is transferred to the endoscopy suite.  General IV anesthesia was provided by [facility].  The Olympus videogastroscope was passed from mouth to the esophagus.  Careful examination of the esophagus from the cricopharyngeus to the GE junction revealed no evidence of esophageal varices or esophagitis.  A small, noninflamed hiatal hernia was noted.  The endoscope was advanced to the stomach where inspection of the body, cardia, fundus and antrum revealed no abnormalities.  The pyloric channel was normal.  There was intense erosive duodenitis noted in the duodenal bulb, the post bulbar duodenum was normal to D3.  Biopsies were obtained from the antrum to assess for Helicobacter pylori.  Retroflexed view of the cardia of the stomach revealed no additional abnormalities.  Esophagus was carefully reexamined on withdrawal of the endoscope and again no abnormalities were noted.  The procedure is well tolerated the patient returned to the recovery area for observation.      The patient can resume his normal diet today and normal activities tomorrow.  If the test for H pylori is positive appropriate antibiotic intervention will be recommended.  OUTCOME: Patient tolerated treatment/procedure well without complication and is now ready for discharge.    DISPOSITION: Home or Self Care    FINAL DIAGNOSIS:  Erosive duodenitis <principal problem not specified>    FOLLOWUP: In clinic    DISCHARGE INSTRUCTIONS:    Discharge Procedure Orders   Diet general     Diet general     Call MD for:  temperature >100.4     Call MD for:  persistent nausea and vomiting     Call MD for:  severe uncontrolled pain     Call MD for:  difficulty breathing, headache or visual disturbances      Call MD for:  temperature >100.4     Call MD for:  persistent nausea and vomiting     Call MD for:  severe uncontrolled pain     Call MD for:  difficulty breathing, headache or visual disturbances     Activity as tolerated     Activity as tolerated         Clinical Reference Documents Added to Patient Instructions         Document    [facility] ENDOSCOPY DISCHARGE INSTRUCTIONS (ENGLISH)            TIME SPENT ON DISCHARGE: 10 minutes

## 2022-10-13 NOTE — PROVATION PATIENT INSTRUCTIONS
Discharge Summary/Instructions after an Endoscopic Procedure  Patient Name: Denis Ramon  Patient MRN: [id number]  Patient YOB: 1965 Thursday, October 13, 2022  Pilo Licona MD  Dear patient,  As a result of recent federal legislation (The Federal Cures Act), you may   receive lab or pathology results from your procedure in your [facility]   account before your physician is able to contact you. Your physician or   their representative will relay the results to you with their   recommendations at their soonest availability.  Thank you,  RESTRICTIONS:  During your procedure today, you received medications for sedation.  These   medications may affect your judgment, balance and coordination.  Therefore,   for 24 hours, you have the following restrictions:   - DO NOT drive a car, operate machinery, make legal/financial decisions,   sign important papers or drink alcohol.    ACTIVITY:  Today: no heavy lifting, straining or running due to procedural   sedation/anesthesia.  The following day: return to full activity including work.  DIET:  Eat and drink normally unless instructed otherwise.     TREATMENT FOR COMMON SIDE EFFECTS:  - Mild abdominal pain, nausea, belching, bloating or excessive gas:  rest,   eat lightly and use a heating pad.  - Sore Throat: treat with throat lozenges and/or gargle with warm salt   water.  - Because air was used during the procedure, expelling large amounts of air   from your rectum or belching is normal.  - If a bowel prep was taken, you may not have a bowel movement for 1-3 days.    This is normal.  SYMPTOMS TO WATCH FOR AND REPORT TO YOUR PHYSICIAN:  1. Abdominal pain or bloating, other than gas cramps.  2. Chest pain.  3. Back pain.  4. Signs of infection such as: chills or fever occurring within 24 hours   after the procedure.  5. Rectal bleeding, which would show as bright red, maroon, or black stools.   (A tablespoon of blood from the rectum is not serious, especially  if   hemorrhoids are present.)  6. Vomiting.  7. Weakness or dizziness.  GO DIRECTLY TO THE NEAREST EMERGENCY ROOM IF YOU HAVE ANY OF THE FOLLOWING:      Difficulty breathing              Chills and/or fever over 101 F   Persistent vomiting and/or vomiting blood   Severe abdominal pain   Severe chest pain   Black, tarry stools   Bleeding- more than one tablespoon   Any other symptom or condition that you feel may need urgent attention  Your doctor recommends these additional instructions:  If any biopsies were taken, your doctors clinic will contact you in 1 to 2   weeks with any results.  - Await pathology results.   - Discharge patient to home (ambulatory).   - Resume previous diet today.   - Continue present medications.  For questions, problems or results please call your physician - Pilo Licona MD at Work:  [phone].  [facility] , EMERGENCY ROOM PHONE NUMBER: [phone]  IF A COMPLICATION OR EMERGENCY SITUATION ARISES AND YOU ARE UNABLE TO REACH   YOUR PHYSICIAN - GO DIRECTLY TO THE EMERGENCY ROOM.  MD Pilo Freire MD  10/13/2022 9:05:07 AM  This report has been verified and signed electronically.  Dear patient,  As a result of recent federal legislation (The Federal Cures Act), you may   receive lab or pathology results from your procedure in your [facility]   account before your physician is able to contact you. Your physician or   their representative will relay the results to you with their   recommendations at their soonest availability.  Thank you,  PROVATION

## 2022-10-13 NOTE — H&P
EGD History and Physical    Patient Name: Denis Ramon Jr.  MRN: [id number]  : 1965  Date of Procedure:  10/13/2022  Referring Physician: Amina Vincent FNP  Primary Physician: DM Jacobs  Procedure Physician: Dr. Livan MD     Procedure - EGD  ASA - per anesthesia  Mallampati - per anesthesia  History of Anesthesia problems - no  Family history Anesthesia problems -  no   Plan of anesthesia - General    Diagnosis:  Hepatitis C with cirrhosis  Chief Complaint: Same as above    HPI: Patient is an 57 y.o. male with past medical history of Hepatitis C with cirrhosis, R cerebellar infarct approximately 2 years ago and HTN who is here for the above.       Last colonoscopy: n/a  Family history: n/a  Anticoagulation: none; takes aspirin daily     ROS:  Constitutional: No fevers, chills, No weight loss  CV: No chest pain  Pulm: No cough, No shortness of breath  GI: see HPI    Medical History:   Past Medical History:   Diagnosis Date    Cirrhosis     HLD (hyperlipidemia)     Hypertension     Stroke          Surgical History:   Past Surgical History:   Procedure Laterality Date    BRAIN SURGERY         Family History:   Family History   Problem Relation Age of Onset    Diabetes Mother     Breast cancer Mother     Lung disease Father     No Known Problems Sister    .    Social History:   Social History     Socioeconomic History    Marital status: Single   Occupational History    Occupation: disabled   Tobacco Use    Smoking status: Every Day     Packs/day: 1.00     Types: Cigarettes    Smokeless tobacco: Never   Substance and Sexual Activity    Alcohol use: Yes     Alcohol/week: 6.0 - 8.0 standard drinks     Types: 6 - 8 Cans of beer per week    Drug use: Yes     Frequency: 7.0 times per week     Types: Marijuana     Comment: past-cocaine    Sexual activity: Yes     Partners: Female       Review of patient's allergies indicates:  No Known Allergies    Medications:   Medications Prior to Admission   Medication  Sig Dispense Refill Last Dose    amLODIPine (NORVASC) 10 MG tablet TAKE 1 TABLET BY MOUTH EVERY DAY 30 tablet 1 Taking    aspirin (ECOTRIN) 81 MG EC tablet Take 81 mg by mouth once daily.   Taking    atorvastatin (LIPITOR) 20 MG tablet Take 1 tablet (20 mg total) by mouth once daily. At bedtime. 90 tablet 1 Taking    hydrALAZINE (APRESOLINE) 50 MG tablet Take 1 tablet (50 mg total) by mouth 2 (two) times daily. 60 tablet 3 Taking    lisinopriL (PRINIVIL,ZESTRIL) 20 MG tablet Take 1 tablet (20 mg total) by mouth once daily. 90 tablet 3 Taking    metoprolol tartrate (LOPRESSOR) 50 MG tablet Take 1 tablet (50 mg total) by mouth 2 (two) times daily. 60 tablet 3 Taking         Physical Exam:    Vital Signs: There were no vitals filed for this visit.  There were no vitals taken for this visit.    General:          Well appearing in no acute distress  Lungs: Clear to auscultation bilaterally, respirations unlabored  Heart: Regular rate and rhythm, S1 and S2 normal, no obvious murmurs  Abdomen:         Soft, non-tender, bowel sounds normal, no masses, no organomegaly        Labs:  Lab Results   Component Value Date    WBC 9.8 08/16/2022    HGB 16.3 08/16/2022    HCT 46.9 08/16/2022    MCV 94.9 (H) 08/16/2022     08/16/2022     Lab Results   Component Value Date    INR 1.05 08/16/2022    PTT 25.8 09/15/2020     Lab Results   Component Value Date     08/16/2022    K 3.2 (L) 08/16/2022    CO2 31 (H) 08/16/2022    BUN 6.1 (L) 08/16/2022    CREATININE 0.89 08/16/2022    LABPROT 7.5 08/16/2022    ALBUMIN 3.9 08/16/2022    BILITOT 1.4 08/16/2022    ALKPHOS 69 08/16/2022    ALT 37 08/16/2022    AST 39 (H) 08/16/2022       Assessment and Plan:     History reviewed, vital signs satisfactory, cardiopulmonary status satisfactory.  I have explained the sedation options, risks, benefits, and alternatives of this endoscopic procedure to the patient including but not limited to bleeding, inflammation, infection, perforation,  and death.  All questions were answered and the patient consented to proceed with procedure as planned.   The patient is deemed an appropriate candidate for the sedation as planned.      Elvia Joshi MD  [employer] General Surgery, PGY2  10/13/2022  8:11 AM

## 2022-10-13 NOTE — TRANSFER OF CARE
"Anesthesia Transfer of Care Note    Patient: Denis Ramon Jr.    Procedure(s) Performed: Procedure(s) (LRB):  EGD, WITH CLOSED BIOPSY (N/A)    Patient location: GI    Anesthesia Type: general    Post pain: adequate analgesia    Post assessment: no apparent anesthetic complications and tolerated procedure well    Post vital signs: stable    Level of consciousness: awake    Nausea/Vomiting: no nausea/vomiting    Complications: none    Transfer of care protocol was followed      Last vitals:   Visit Vitals  /71   Pulse 67   Temp 36.6 °C (97.9 °F) (Oral)   Resp (!) 24   Ht 5' 8" (1.727 m)   Wt 74.2 kg (163 lb 9.3 oz)   SpO2 (!) 92%   BMI 24.87 kg/m²     "

## 2022-10-13 NOTE — ANESTHESIA POSTPROCEDURE EVALUATION
Anesthesia Post Evaluation    Patient: Denis Ramon Jr.    Procedure(s) Performed: Procedure(s) (LRB):  EGD, WITH CLOSED BIOPSY (N/A)    Final Anesthesia Type: general      Patient location during evaluation: GI PACU  Patient participation: Yes- Able to Participate  Level of consciousness: awake and alert  Post-procedure vital signs: reviewed and stable  Pain management: adequate  Airway patency: patent    PONV status at discharge: No PONV  Anesthetic complications: no      Cardiovascular status: hemodynamically stable  Respiratory status: unassisted and room air  Follow-up not needed.              Pain/Michael Score: Michael Score: 9 (10/13/2022  9:02 AM)

## 2022-10-17 LAB
DHEA SERPL-MCNC: NORMAL
ESTROGEN SERPL-MCNC: NORMAL PG/ML
INSULIN SERPL-ACNC: NORMAL U[IU]/ML
LAB AP CLINICAL INFORMATION: NORMAL
LAB AP GROSS DESCRIPTION: NORMAL
LAB AP REPORT FOOTNOTES: NORMAL
T3RU NFR SERPL: NORMAL %

## 2022-10-28 ENCOUNTER — TELEPHONE (OUTPATIENT)
Dept: GASTROENTEROLOGY | Facility: CLINIC | Age: 57
End: 2022-10-28
Payer: MEDICAID

## 2022-10-28 DIAGNOSIS — A04.8 H. PYLORI INFECTION: Primary | ICD-10-CM

## 2022-10-28 RX ORDER — PANTOPRAZOLE SODIUM 40 MG/1
40 TABLET, DELAYED RELEASE ORAL DAILY
Qty: 30 TABLET | Refills: 11 | Status: SHIPPED | OUTPATIENT
Start: 2022-10-28 | End: 2023-01-09 | Stop reason: SDUPTHER

## 2022-10-28 RX ORDER — BISMUTH SUBCITRATE POTASSIUM, METRONIDAZOLE, TETRACYCLINE HYDROCHLORIDE 140; 125; 125 MG/1; MG/1; MG/1
3 CAPSULE ORAL
Qty: 120 CAPSULE | Refills: 0 | Status: SHIPPED | OUTPATIENT
Start: 2022-10-28 | End: 2022-11-07

## 2022-10-28 NOTE — TELEPHONE ENCOUNTER
Pt notified of results and medication prescribed. Pt also notified that repeat stool test is required 4 weeks after completion of treatment. Pt verbalized understanding.    ----- Message from DM Head sent at 10/28/2022 11:35 AM CDT -----  This patient is positive for H. Pylori. I have sent in treatment for H. Pylori (Pylera and pantoprazole 40 mg daily) and have ordered repeat H. Pylori stool antigen to be completed 4 weeks after treatment to assure eradication. Thanks

## 2022-10-28 NOTE — PROGRESS NOTES
This patient is positive for H. Pylori. I have sent in treatment for H. Pylori (Pylera and pantoprazole 40 mg daily) and have ordered repeat H. Pylori stool antigen to be completed 4 weeks after treatment to assure eradication. Thanks

## 2022-11-18 ENCOUNTER — TELEPHONE (OUTPATIENT)
Dept: GASTROENTEROLOGY | Facility: CLINIC | Age: 57
End: 2022-11-18

## 2022-11-18 NOTE — TELEPHONE ENCOUNTER
----- Message from DM Head sent at 10/28/2022 11:35 AM CDT -----  This patient is positive for H. Pylori. I have sent in treatment for H. Pylori (Pylera and pantoprazole 40 mg daily) and have ordered repeat H. Pylori stool antigen to be completed 4 weeks after treatment to assure eradication. Thanks

## 2022-11-18 NOTE — TELEPHONE ENCOUNTER
Attempted to contact PT.  Phone is not accepting calls.  I need to confirm that he picked up his medication and completed it.  Reminder to turn in stool next week.

## 2022-12-05 NOTE — TELEPHONE ENCOUNTER
Unable to contact pt. Pt's mother states she did let pt know that he is due to turn in stool sample and should be coming on 12/6/22 to bring to lab.   Will wait on results of culture and call pt if treatment needed. Writer informed pt she would be contacted only if preop labs were abnormal.

## 2022-12-06 ENCOUNTER — PROCEDURE VISIT (OUTPATIENT)
Dept: INFECTIOUS DISEASES | Facility: CLINIC | Age: 57
End: 2022-12-06
Attending: NURSE PRACTITIONER
Payer: MEDICAID

## 2022-12-06 DIAGNOSIS — B19.20 COMPENSATED CIRRHOSIS RELATED TO HEPATITIS C VIRUS (HCV): ICD-10-CM

## 2022-12-06 DIAGNOSIS — K74.69 COMPENSATED CIRRHOSIS RELATED TO HEPATITIS C VIRUS (HCV): ICD-10-CM

## 2022-12-06 PROCEDURE — 91200 LIVER ELASTOGRAPHY: CPT | Mod: PBBFAC | Performed by: INTERNAL MEDICINE

## 2022-12-06 NOTE — PROGRESS NOTES
Patient here for FibroScan visit. Reports he drank two 48 ounce beers this morning and denies any implanted electronic devices. Position patient for the procedure to expose the right rib cage. Explained procedure to the patient. FibroScan exam complete and was tolerated without any problems.

## 2022-12-21 ENCOUNTER — TELEPHONE (OUTPATIENT)
Dept: GASTROENTEROLOGY | Facility: CLINIC | Age: 57
End: 2022-12-21
Payer: MEDICAID

## 2022-12-21 NOTE — TELEPHONE ENCOUNTER
Attempted to contact PT to remind him of stool test.  He was not home and does not have a working cell anymore.  Will try again later.

## 2023-01-03 ENCOUNTER — TELEPHONE (OUTPATIENT)
Dept: GASTROENTEROLOGY | Facility: CLINIC | Age: 58
End: 2023-01-03
Payer: MEDICAID

## 2023-01-09 DIAGNOSIS — A04.8 H. PYLORI INFECTION: ICD-10-CM

## 2023-01-09 DIAGNOSIS — Z76.0 ENCOUNTER FOR MEDICATION REFILL: ICD-10-CM

## 2023-01-10 RX ORDER — ASPIRIN 81 MG/1
81 TABLET ORAL DAILY
Qty: 30 TABLET | Refills: 1 | OUTPATIENT
Start: 2023-01-10

## 2023-01-10 RX ORDER — METOPROLOL TARTRATE 50 MG/1
50 TABLET ORAL 2 TIMES DAILY
Qty: 60 TABLET | Refills: 3 | Status: SHIPPED | OUTPATIENT
Start: 2023-01-10 | End: 2023-01-23 | Stop reason: SDUPTHER

## 2023-01-10 RX ORDER — ATORVASTATIN CALCIUM 20 MG/1
20 TABLET, FILM COATED ORAL DAILY
Qty: 90 TABLET | Refills: 1 | Status: SHIPPED | OUTPATIENT
Start: 2023-01-10 | End: 2023-01-23 | Stop reason: SDUPTHER

## 2023-01-10 RX ORDER — HYDRALAZINE HYDROCHLORIDE 50 MG/1
50 TABLET, FILM COATED ORAL 2 TIMES DAILY
Qty: 60 TABLET | Refills: 3 | Status: SHIPPED | OUTPATIENT
Start: 2023-01-10 | End: 2023-01-23 | Stop reason: SDUPTHER

## 2023-01-10 RX ORDER — PANTOPRAZOLE SODIUM 40 MG/1
40 TABLET, DELAYED RELEASE ORAL DAILY
Qty: 30 TABLET | Refills: 2 | Status: SHIPPED | OUTPATIENT
Start: 2023-01-10 | End: 2023-01-31 | Stop reason: SDUPTHER

## 2023-01-10 RX ORDER — LISINOPRIL 20 MG/1
20 TABLET ORAL DAILY
Qty: 90 TABLET | Refills: 3 | Status: SHIPPED | OUTPATIENT
Start: 2023-01-10 | End: 2023-01-23 | Stop reason: SDUPTHER

## 2023-01-10 RX ORDER — AMLODIPINE BESYLATE 10 MG/1
10 TABLET ORAL DAILY
Qty: 30 TABLET | Refills: 1 | OUTPATIENT
Start: 2023-01-10

## 2023-01-23 ENCOUNTER — OFFICE VISIT (OUTPATIENT)
Dept: FAMILY MEDICINE | Facility: CLINIC | Age: 58
End: 2023-01-23
Payer: MEDICAID

## 2023-01-23 VITALS
HEART RATE: 69 BPM | RESPIRATION RATE: 18 BRPM | OXYGEN SATURATION: 95 % | TEMPERATURE: 98 F | BODY MASS INDEX: 25.16 KG/M2 | SYSTOLIC BLOOD PRESSURE: 111 MMHG | HEIGHT: 68 IN | WEIGHT: 166 LBS | DIASTOLIC BLOOD PRESSURE: 66 MMHG

## 2023-01-23 DIAGNOSIS — E78.2 MIXED HYPERLIPIDEMIA: ICD-10-CM

## 2023-01-23 DIAGNOSIS — I10 PRIMARY HYPERTENSION: ICD-10-CM

## 2023-01-23 DIAGNOSIS — Z00.00 WELLNESS EXAMINATION: Primary | ICD-10-CM

## 2023-01-23 DIAGNOSIS — Z76.0 ENCOUNTER FOR MEDICATION REFILL: ICD-10-CM

## 2023-01-23 DIAGNOSIS — K40.90 INGUINAL HERNIA OF LEFT SIDE WITHOUT OBSTRUCTION OR GANGRENE: ICD-10-CM

## 2023-01-23 LAB — PSA SERPL-MCNC: 0.35 NG/ML

## 2023-01-23 PROCEDURE — 3008F BODY MASS INDEX DOCD: CPT | Mod: CPTII,,, | Performed by: NURSE PRACTITIONER

## 2023-01-23 PROCEDURE — 4010F PR ACE/ARB THEARPY RXD/TAKEN: ICD-10-PCS | Mod: CPTII,,, | Performed by: NURSE PRACTITIONER

## 2023-01-23 PROCEDURE — 1159F MED LIST DOCD IN RCRD: CPT | Mod: CPTII,,, | Performed by: NURSE PRACTITIONER

## 2023-01-23 PROCEDURE — 1160F RVW MEDS BY RX/DR IN RCRD: CPT | Mod: CPTII,,, | Performed by: NURSE PRACTITIONER

## 2023-01-23 PROCEDURE — 36415 COLL VENOUS BLD VENIPUNCTURE: CPT | Performed by: NURSE PRACTITIONER

## 2023-01-23 PROCEDURE — 3078F PR MOST RECENT DIASTOLIC BLOOD PRESSURE < 80 MM HG: ICD-10-PCS | Mod: CPTII,,, | Performed by: NURSE PRACTITIONER

## 2023-01-23 PROCEDURE — 1160F PR REVIEW ALL MEDS BY PRESCRIBER/CLIN PHARMACIST DOCUMENTED: ICD-10-PCS | Mod: CPTII,,, | Performed by: NURSE PRACTITIONER

## 2023-01-23 PROCEDURE — 99214 PR OFFICE/OUTPT VISIT, EST, LEVL IV, 30-39 MIN: ICD-10-PCS | Mod: S$PBB,,, | Performed by: NURSE PRACTITIONER

## 2023-01-23 PROCEDURE — 99214 OFFICE O/P EST MOD 30 MIN: CPT | Mod: PBBFAC | Performed by: NURSE PRACTITIONER

## 2023-01-23 PROCEDURE — 3078F DIAST BP <80 MM HG: CPT | Mod: CPTII,,, | Performed by: NURSE PRACTITIONER

## 2023-01-23 PROCEDURE — 4010F ACE/ARB THERAPY RXD/TAKEN: CPT | Mod: CPTII,,, | Performed by: NURSE PRACTITIONER

## 2023-01-23 PROCEDURE — 84153 ASSAY OF PSA TOTAL: CPT | Performed by: NURSE PRACTITIONER

## 2023-01-23 PROCEDURE — 1159F PR MEDICATION LIST DOCUMENTED IN MEDICAL RECORD: ICD-10-PCS | Mod: CPTII,,, | Performed by: NURSE PRACTITIONER

## 2023-01-23 PROCEDURE — 99214 OFFICE O/P EST MOD 30 MIN: CPT | Mod: S$PBB,,, | Performed by: NURSE PRACTITIONER

## 2023-01-23 PROCEDURE — 3008F PR BODY MASS INDEX (BMI) DOCUMENTED: ICD-10-PCS | Mod: CPTII,,, | Performed by: NURSE PRACTITIONER

## 2023-01-23 PROCEDURE — 3074F SYST BP LT 130 MM HG: CPT | Mod: CPTII,,, | Performed by: NURSE PRACTITIONER

## 2023-01-23 PROCEDURE — 3074F PR MOST RECENT SYSTOLIC BLOOD PRESSURE < 130 MM HG: ICD-10-PCS | Mod: CPTII,,, | Performed by: NURSE PRACTITIONER

## 2023-01-23 RX ORDER — ATORVASTATIN CALCIUM 20 MG/1
20 TABLET, FILM COATED ORAL DAILY
Qty: 90 TABLET | Refills: 3 | Status: SHIPPED | OUTPATIENT
Start: 2023-01-23 | End: 2024-01-29

## 2023-01-23 RX ORDER — METOPROLOL TARTRATE 50 MG/1
50 TABLET ORAL DAILY
Qty: 90 TABLET | Refills: 3 | Status: SHIPPED | OUTPATIENT
Start: 2023-01-23 | End: 2023-09-29

## 2023-01-23 RX ORDER — ATORVASTATIN CALCIUM 20 MG/1
20 TABLET, FILM COATED ORAL DAILY
Qty: 90 TABLET | Refills: 1 | Status: CANCELLED | OUTPATIENT
Start: 2023-01-23

## 2023-01-23 RX ORDER — HYDRALAZINE HYDROCHLORIDE 50 MG/1
50 TABLET, FILM COATED ORAL DAILY
Qty: 90 TABLET | Refills: 3 | Status: SHIPPED | OUTPATIENT
Start: 2023-01-23 | End: 2023-12-11 | Stop reason: SDUPTHER

## 2023-01-23 RX ORDER — AMLODIPINE BESYLATE 10 MG/1
10 TABLET ORAL DAILY
Qty: 90 TABLET | Refills: 3 | Status: SHIPPED | OUTPATIENT
Start: 2023-01-23 | End: 2023-04-25 | Stop reason: SDUPTHER

## 2023-01-23 RX ORDER — LISINOPRIL 20 MG/1
20 TABLET ORAL DAILY
Qty: 90 TABLET | Refills: 3 | Status: SHIPPED | OUTPATIENT
Start: 2023-01-23 | End: 2023-04-25 | Stop reason: SDUPTHER

## 2023-01-23 NOTE — ASSESSMENT & PLAN NOTE
Declined treatment.  Instructed patient to start feeling pain and unable to reduce to go to nearest emergency department or call 911.  Questions solicited and answered, patient verbalized.

## 2023-01-23 NOTE — ASSESSMENT & PLAN NOTE
"Medication refills initiated.  Blood pressure 111/66 pulse 68 beats per minute.  Hydralazine 50 mg p.o once daily  Lisinopril 20 mg p.o. once daily.  Metoprolol 50 mg p.o. once daily  Lipitor 20 mg p.o. once daily  Norvasc 10 mg p.o. once daily  Encouraged exercise for 30 minutes 5 days per week.  Low-cholesterol, low-fat diet.  Declined smoking cessation and alcohol,"not ready".  "

## 2023-01-23 NOTE — PROGRESS NOTES
"Patient Name: Denis Ramon Jr.   : 1965  MRN: [id number]     SUBJECTIVE DATA:    CHIEF COMPLAINT:   Denis Ramon Jr. is a 57 y.o. male who presents to clinic today with Follow-up (HTN)        HPI: Denis Ramon Jr. is a 57 y.o. male here for hypertension Follow-up and medications refill.   .     Hypertension:  Controlled, blood pressure 111/66 with a pulse of 68 beats per minute. medication refills on his amlodipine 10 mg, lisinopril 20 mg, metoprolol 50 mg, hydralazine 50 mg.   Patient compliant with his medications.  "Metoprolol and hydralazine I only take once a day", " sometimes I take hydralazine depending how I feel".  Denies any chest pain or short of breath.    Hyperlipidemia:  2022 lipid panel:  Total cholesterol 117, HDL 34, triglycerides 67, LDL 70, very low density lipoprotein 13.  Currently on Lipitor mg p.o. once daily.    Prostate exam completed, PSA collected.  Declined referral for left inguinal hernia repair.  Patient declined colonoscopy, declined Cologuard, declined pneumococcal vaccine and shingles vaccine for now.     Patient not ready to quit smoking nor stop drinking alcohol despite education on smoking and alcohol hazard.  Patient states he is keeping up his follow-up appointment with other providers.     return to clinic in 3 months for a follow-up hypertension.    Patient denies chest pain, shortness of breath, dyspnea on exertion, palpitations, peripheral edema, abdominal pain, nausea, vomiting, diarrhea, constipation, fatigue, fever, chills, dysuria,  hematuria, melena, or hematochezia.   HPI      ALLERGIES: Review of patient's allergies indicates:  No Known Allergies      ROS:  Review of Systems   All other systems reviewed and are negative.      OBJECTIVE DATA:  Vital signs  Vitals:    23 0755   BP: 111/66   Pulse: 69   Resp: 18   Temp: 98.1 °F (36.7 °C)   TempSrc: Oral   SpO2: 95%   Weight: 75.3 kg (166 lb)   Height: 5' 8" (1.727 m)      Body mass index is 25.24 " kg/m².    PHYSICAL EXAM:   Physical Exam  Constitutional:       General: He is awake. He is not in acute distress.     Appearance: Normal appearance. He is well-developed, well-groomed and normal weight. He is not ill-appearing, toxic-appearing or diaphoretic.   HENT:      Head: Normocephalic and atraumatic.      Right Ear: Tympanic membrane, ear canal and external ear normal.      Left Ear: Tympanic membrane, ear canal and external ear normal.      Nose: Nose normal.      Mouth/Throat:      Lips: Pink.      Mouth: Mucous membranes are moist.      Pharynx: Oropharynx is clear. Uvula midline.   Eyes:      General: Lids are normal. Gaze aligned appropriately. Scleral icterus present.      Extraocular Movements: Extraocular movements intact.      Conjunctiva/sclera: Conjunctivae normal.      Pupils: Pupils are equal, round, and reactive to light.   Neck:      Thyroid: No thyroid mass, thyromegaly or thyroid tenderness.      Trachea: Trachea and phonation normal.   Cardiovascular:      Rate and Rhythm: Normal rate and regular rhythm.      Pulses: Normal pulses.           Radial pulses are 2+ on the right side and 2+ on the left side.        Dorsalis pedis pulses are 2+ on the right side and 2+ on the left side.      Heart sounds: Normal heart sounds. No murmur heard.  Pulmonary:      Effort: Pulmonary effort is normal.      Breath sounds: Normal breath sounds and air entry. No wheezing or rhonchi.   Abdominal:      General: Bowel sounds are normal.      Palpations: Abdomen is soft.      Tenderness: There is no abdominal tenderness. There is no right CVA tenderness or left CVA tenderness.      Hernia: A hernia is present. Hernia is present in the left inguinal area.   Genitourinary:     Penis: Normal and circumcised. No tenderness.       Testes: Normal. Cremasteric reflex is present.      Epididymis:      Right: Normal.      Left: Normal.      Prostate: Normal.      Rectum: Normal.       Musculoskeletal:         General:  "Normal range of motion.      Cervical back: Full passive range of motion without pain, normal range of motion and neck supple. No rigidity or tenderness.      Right lower leg: No edema.      Left lower leg: No edema.   Lymphadenopathy:      Lower Body: No right inguinal adenopathy. No left inguinal adenopathy.   Skin:     General: Skin is warm.      Capillary Refill: Capillary refill takes less than 2 seconds.   Neurological:      General: No focal deficit present.      Mental Status: He is alert and oriented to person, place, and time. Mental status is at baseline.   Psychiatric:         Mood and Affect: Mood normal.         Behavior: Behavior normal. Behavior is cooperative.         Thought Content: Thought content normal.         Judgment: Judgment normal.        ASSESSMENT/PLAN:  1. Wellness examination  Assessment & Plan:  Prostate manual exam performed.  PSA collected.  Will notify patient of test results when they become available.    Orders:  -     PSA, Screening    2. Primary hypertension  Assessment & Plan:  Medication refills initiated.  Blood pressure 111/66 pulse 68 beats per minute.  Hydralazine 50 mg p.o once daily  Lisinopril 20 mg p.o. once daily.  Metoprolol 50 mg p.o. once daily  Lipitor 20 mg p.o. once daily  Norvasc 10 mg p.o. once daily  Encouraged exercise for 30 minutes 5 days per week.  Low-cholesterol, low-fat diet.  Declined smoking cessation and alcohol,"not ready".    Orders:  -     amLODIPine (NORVASC) 10 MG tablet; Take 1 tablet (10 mg total) by mouth once daily.  Dispense: 90 tablet; Refill: 3  -     metoprolol tartrate (LOPRESSOR) 50 MG tablet; Take 1 tablet (50 mg total) by mouth once daily.  Dispense: 90 tablet; Refill: 3  -     lisinopriL (PRINIVIL,ZESTRIL) 20 MG tablet; Take 1 tablet (20 mg total) by mouth once daily.  Dispense: 90 tablet; Refill: 3  -     hydrALAZINE (APRESOLINE) 50 MG tablet; Take 1 tablet (50 mg total) by mouth once daily.  Dispense: 90 tablet; Refill: 3    3. " Mixed hyperlipidemia  Assessment & Plan:  Lipid panel within normal limit.  Continue Lipitor 20 mg p.o. once daily.  Follow low-cholesterol, low-fat diet.  Increase fiber intake to 30 g per day.  Stay hydrated with water.  Patient will read discharge instructions material.      4. Inguinal hernia of left side without obstruction or gangrene  Assessment & Plan:  Declined treatment.  Instructed patient to start feeling pain and unable to reduce to go to nearest emergency department or call 911.  Questions solicited and answered, patient verbalized.      5. Encounter for medication refill  -     amLODIPine (NORVASC) 10 MG tablet; Take 1 tablet (10 mg total) by mouth once daily.  Dispense: 90 tablet; Refill: 3  -     metoprolol tartrate (LOPRESSOR) 50 MG tablet; Take 1 tablet (50 mg total) by mouth once daily.  Dispense: 90 tablet; Refill: 3  -     lisinopriL (PRINIVIL,ZESTRIL) 20 MG tablet; Take 1 tablet (20 mg total) by mouth once daily.  Dispense: 90 tablet; Refill: 3  -     hydrALAZINE (APRESOLINE) 50 MG tablet; Take 1 tablet (50 mg total) by mouth once daily.  Dispense: 90 tablet; Refill: 3  -     atorvastatin (LIPITOR) 20 MG tablet; Take 1 tablet (20 mg total) by mouth once daily. At bedtime.  Dispense: 90 tablet; Refill: 3           RESULTS:  Recent Results (from the past 1008 hour(s))   PSA, Screening    Collection Time: 01/23/23  8:55 AM   Result Value Ref Range    Prostate Specific Antigen 0.35 <=4.00 ng/mL         Follow Up:  Follow up in about 3 months (around 4/23/2023).      Previous medical history/lab work/radiology reviewed and considered during medical management decisions.   Medication list reviewed and medication reconciliation performed.  Patient was provided  and care about his/her current diagnosis (es) and medications including risk/benefit and side effects/adverse events, over the counter medication uses/doses, home self-care and contact precautions,  and red flags and indications for  when to seek immediate medical attention.   Patient was advised to continue compliance with current medication list and medical recommendations.  Patient dvised continued compliance with recommended eating habits/ diets for medical conditions and exercise 150 minutes/ week (if possible) for medical condition (s).  Educational handouts and instructions on selected disease management in AVS (After Visit Summary).    All of the patient's questions were answered to patient's satisfaction.   The patient was receptive, expressed verbal understanding and agreement the above plan.       This note was created with the assistance of a voice recognition software or phone dictation. There may be transcription errors as a result of using this technology however minimal. Effort has been made to assure accuracy of transcription but any obvious errors or omissions should be clarified with the author of the document

## 2023-01-23 NOTE — ASSESSMENT & PLAN NOTE
Lipid panel within normal limit.  Continue Lipitor 20 mg p.o. once daily.  Follow low-cholesterol, low-fat diet.  Increase fiber intake to 30 g per day.  Stay hydrated with water.  Patient will read discharge instructions material.

## 2023-01-23 NOTE — ASSESSMENT & PLAN NOTE
Prostate manual exam performed.  PSA collected.  Will notify patient of test results when they become available.

## 2023-01-24 ENCOUNTER — TELEPHONE (OUTPATIENT)
Dept: FAMILY MEDICINE | Facility: CLINIC | Age: 58
End: 2023-01-24
Payer: MEDICAID

## 2023-01-24 NOTE — TELEPHONE ENCOUNTER
----- Message from Romel Ochoa LPN sent at 1/24/2023  3:15 PM CST -----    ----- Message -----  From: DM Jacobs  Sent: 1/23/2023   2:03 PM CST  To: Romel Ochoa LPN    PLEASE CALL PATIENTS WITH RESULTS,  PSA blood work within normal range.

## 2023-01-25 ENCOUNTER — TELEPHONE (OUTPATIENT)
Dept: FAMILY MEDICINE | Facility: CLINIC | Age: 58
End: 2023-01-25
Payer: MEDICAID

## 2023-01-25 NOTE — TELEPHONE ENCOUNTER
Called patient via phone call and patient understands results given. No further questions at this time.

## 2023-01-31 ENCOUNTER — LAB VISIT (OUTPATIENT)
Dept: LAB | Facility: HOSPITAL | Age: 58
End: 2023-01-31
Attending: NURSE PRACTITIONER
Payer: MEDICAID

## 2023-01-31 DIAGNOSIS — A04.8 H. PYLORI INFECTION: ICD-10-CM

## 2023-01-31 DIAGNOSIS — A04.8 H. PYLORI INFECTION: Primary | ICD-10-CM

## 2023-01-31 LAB — H. PYLORI STOOL: POSITIVE

## 2023-01-31 PROCEDURE — 87338 HPYLORI STOOL AG IA: CPT

## 2023-01-31 RX ORDER — PANTOPRAZOLE SODIUM 40 MG/1
40 TABLET, DELAYED RELEASE ORAL DAILY
Qty: 30 TABLET | Refills: 2 | Status: SHIPPED | OUTPATIENT
Start: 2023-01-31 | End: 2023-07-25 | Stop reason: SDUPTHER

## 2023-01-31 RX ORDER — BISMUTH SUBCITRATE POTASSIUM, METRONIDAZOLE, TETRACYCLINE HYDROCHLORIDE 140; 125; 125 MG/1; MG/1; MG/1
3 CAPSULE ORAL
Qty: 120 CAPSULE | Refills: 0 | Status: SHIPPED | OUTPATIENT
Start: 2023-01-31 | End: 2023-02-10

## 2023-01-31 NOTE — PROGRESS NOTES
Please notify H. Pylori positive. Please assure that he took his Pylera prescription and PPI medication as prescribed for 10 days and let me know. We will consider retreating with Pylera at this time. Thanks

## 2023-02-01 DIAGNOSIS — A04.8 H. PYLORI INFECTION: ICD-10-CM

## 2023-02-01 NOTE — TELEPHONE ENCOUNTER
Pt states he never received Pylera due to cost at his pharmacy. Pt informed new Rx for Pylera will be sent to [facility] pharmacy, pt okay with price.    Call to both numbers in chart, not able to reach pt or leave message.    ----- Message from DM Head sent at 1/31/2023  2:05 PM CST -----  Please notify H. Pylori positive. Please assure that he took his Pylera prescription and PPI medication as prescribed for 10 days and let me know. We will consider retreating with Pylera at this time. Thanks

## 2023-02-10 RX ORDER — BISMUTH SUBCITRATE POTASSIUM, METRONIDAZOLE, TETRACYCLINE HYDROCHLORIDE 140; 125; 125 MG/1; MG/1; MG/1
3 CAPSULE ORAL
Qty: 120 CAPSULE | Refills: 0 | Status: SHIPPED | OUTPATIENT
Start: 2023-02-10 | End: 2023-02-20

## 2023-02-20 NOTE — PROCEDURES
Fibroscan Procedure     Name: Denis Ramon Jr.  Date of Procedure : 2022  Interpreting Physician: Clau Bailey MD, MPH  Diagnosis: HCV    Probe: XL    Fibroscan readin.9 kPa    Fibrosis: F2     CAP readin dB/m    Steatosis: S1      Miscellaneous:    2021: Fibroscan: 14.0 kPa, S2-3, F4

## 2023-02-22 ENCOUNTER — TELEPHONE (OUTPATIENT)
Dept: GASTROENTEROLOGY | Facility: CLINIC | Age: 58
End: 2023-02-22
Payer: MEDICAID

## 2023-02-22 NOTE — TELEPHONE ENCOUNTER
No answer/not working number. Unable to contact letter mailed.     FibroScan results  Received: 2 days ago  DM Head [facility] Clinical Support Staff  Please notify F2 fibrosis and S1 steatosis without cirrhosis. Thanks

## 2023-02-22 NOTE — LETTER
February 24, 2023    Denisvic Ramon Jr.  [address] [place] [address] [facility]  [address] [facility] [facility] [address]  Phone: [phone] Dear Mr. Ramon:    We have been trying to contact you. Please call the clinic at your earliest convenience.        Sincerely,        Rhina Chou LPN

## 2023-02-24 DIAGNOSIS — A04.8 H. PYLORI INFECTION: ICD-10-CM

## 2023-02-24 DIAGNOSIS — I10 PRIMARY HYPERTENSION: ICD-10-CM

## 2023-02-24 DIAGNOSIS — Z76.0 ENCOUNTER FOR MEDICATION REFILL: ICD-10-CM

## 2023-02-24 RX ORDER — AMLODIPINE BESYLATE 10 MG/1
10 TABLET ORAL DAILY
Qty: 90 TABLET | Refills: 3 | OUTPATIENT
Start: 2023-02-24

## 2023-02-24 RX ORDER — PANTOPRAZOLE SODIUM 40 MG/1
40 TABLET, DELAYED RELEASE ORAL DAILY
Qty: 30 TABLET | Refills: 2 | OUTPATIENT
Start: 2023-02-24 | End: 2024-02-24

## 2023-02-24 RX ORDER — ATORVASTATIN CALCIUM 20 MG/1
20 TABLET, FILM COATED ORAL DAILY
Qty: 90 TABLET | Refills: 3 | OUTPATIENT
Start: 2023-02-24

## 2023-02-24 NOTE — TELEPHONE ENCOUNTER
----- Message from Michelle Lentz sent at 2/24/2023 10:49 AM CST -----  Regarding: Refills  Provider:Dona Herbert NP  Preferred Pharmacy: [facility]  - [address]  Last Visit:   Next Visit: [id number]  Patient's Contact Number:     1. Name of Medication: Amlodipine  Dosage: 10 MG TAB  Comments:     2. Name of Medication: Atorvastatin  Dosage: 20 MG TAB  Comments:     3. Name of Medication: Pantroprazole  Dosage: 40 MG TAB  Comments     4. Name of Medication:   Dosage:   Comments:     5. Name of Medication:   Dosage:   Comments:

## 2023-04-18 ENCOUNTER — TELEPHONE (OUTPATIENT)
Dept: GASTROENTEROLOGY | Facility: CLINIC | Age: 58
End: 2023-04-18
Payer: MEDICAID

## 2023-04-18 NOTE — TELEPHONE ENCOUNTER
Call to both number in chart to notify pt of outstanding stool test. Primary number not a working number, secondary number no answer or voicemail. Unable to contact letter mailed.

## 2023-04-18 NOTE — LETTER
April 18, 2023    Denis Yenny Patiño  [address] [place] [address] [facility]  [address] [facility] [address]  Phone: [phone] Dear Mr. Ramon:    Our clinic has been trying to reach you. Please call the clinic at [phone].        If you have any questions or concerns, please don't hesitate to call.    Sincerely,        Rhina Chou LPN

## 2023-04-24 PROBLEM — Z00.00 WELLNESS EXAMINATION: Status: RESOLVED | Noted: 2023-01-23 | Resolved: 2023-04-24

## 2023-04-25 ENCOUNTER — OFFICE VISIT (OUTPATIENT)
Dept: FAMILY MEDICINE | Facility: CLINIC | Age: 58
End: 2023-04-25
Payer: MEDICAID

## 2023-04-25 VITALS
DIASTOLIC BLOOD PRESSURE: 89 MMHG | OXYGEN SATURATION: 99 % | HEIGHT: 68 IN | TEMPERATURE: 98 F | HEART RATE: 68 BPM | SYSTOLIC BLOOD PRESSURE: 144 MMHG | RESPIRATION RATE: 18 BRPM | WEIGHT: 160 LBS | BODY MASS INDEX: 24.25 KG/M2

## 2023-04-25 DIAGNOSIS — I10 PRIMARY HYPERTENSION: Primary | ICD-10-CM

## 2023-04-25 DIAGNOSIS — Z76.0 ENCOUNTER FOR MEDICATION REFILL: ICD-10-CM

## 2023-04-25 DIAGNOSIS — F32.9 REACTIVE DEPRESSION: ICD-10-CM

## 2023-04-25 DIAGNOSIS — F17.200 TOBACCO DEPENDENCE: ICD-10-CM

## 2023-04-25 DIAGNOSIS — F10.10 ALCOHOL ABUSE: ICD-10-CM

## 2023-04-25 PROCEDURE — 3008F BODY MASS INDEX DOCD: CPT | Mod: CPTII,,, | Performed by: NURSE PRACTITIONER

## 2023-04-25 PROCEDURE — 3077F PR MOST RECENT SYSTOLIC BLOOD PRESSURE >= 140 MM HG: ICD-10-PCS | Mod: CPTII,,, | Performed by: NURSE PRACTITIONER

## 2023-04-25 PROCEDURE — 3077F SYST BP >= 140 MM HG: CPT | Mod: CPTII,,, | Performed by: NURSE PRACTITIONER

## 2023-04-25 PROCEDURE — 1159F MED LIST DOCD IN RCRD: CPT | Mod: CPTII,,, | Performed by: NURSE PRACTITIONER

## 2023-04-25 PROCEDURE — 1160F PR REVIEW ALL MEDS BY PRESCRIBER/CLIN PHARMACIST DOCUMENTED: ICD-10-PCS | Mod: CPTII,,, | Performed by: NURSE PRACTITIONER

## 2023-04-25 PROCEDURE — 3008F PR BODY MASS INDEX (BMI) DOCUMENTED: ICD-10-PCS | Mod: CPTII,,, | Performed by: NURSE PRACTITIONER

## 2023-04-25 PROCEDURE — 99214 OFFICE O/P EST MOD 30 MIN: CPT | Mod: PBBFAC | Performed by: NURSE PRACTITIONER

## 2023-04-25 PROCEDURE — 99214 OFFICE O/P EST MOD 30 MIN: CPT | Mod: S$PBB,,, | Performed by: NURSE PRACTITIONER

## 2023-04-25 PROCEDURE — 4010F PR ACE/ARB THEARPY RXD/TAKEN: ICD-10-PCS | Mod: CPTII,,, | Performed by: NURSE PRACTITIONER

## 2023-04-25 PROCEDURE — 3079F DIAST BP 80-89 MM HG: CPT | Mod: CPTII,,, | Performed by: NURSE PRACTITIONER

## 2023-04-25 PROCEDURE — 3079F PR MOST RECENT DIASTOLIC BLOOD PRESSURE 80-89 MM HG: ICD-10-PCS | Mod: CPTII,,, | Performed by: NURSE PRACTITIONER

## 2023-04-25 PROCEDURE — 1159F PR MEDICATION LIST DOCUMENTED IN MEDICAL RECORD: ICD-10-PCS | Mod: CPTII,,, | Performed by: NURSE PRACTITIONER

## 2023-04-25 PROCEDURE — 4010F ACE/ARB THERAPY RXD/TAKEN: CPT | Mod: CPTII,,, | Performed by: NURSE PRACTITIONER

## 2023-04-25 PROCEDURE — 99214 PR OFFICE/OUTPT VISIT, EST, LEVL IV, 30-39 MIN: ICD-10-PCS | Mod: S$PBB,,, | Performed by: NURSE PRACTITIONER

## 2023-04-25 PROCEDURE — 1160F RVW MEDS BY RX/DR IN RCRD: CPT | Mod: CPTII,,, | Performed by: NURSE PRACTITIONER

## 2023-04-25 RX ORDER — LISINOPRIL 20 MG/1
20 TABLET ORAL DAILY
Qty: 90 TABLET | Refills: 3 | Status: SHIPPED | OUTPATIENT
Start: 2023-04-25 | End: 2023-04-25

## 2023-04-25 RX ORDER — LISINOPRIL 20 MG/1
20 TABLET ORAL DAILY
Qty: 90 TABLET | Refills: 3 | Status: SHIPPED | OUTPATIENT
Start: 2023-04-25 | End: 2023-12-11 | Stop reason: SDUPTHER

## 2023-04-25 RX ORDER — ASPIRIN 81 MG/1
81 TABLET ORAL DAILY
Qty: 90 TABLET | Refills: 3 | Status: SHIPPED | OUTPATIENT
Start: 2023-04-25

## 2023-04-25 RX ORDER — AMLODIPINE BESYLATE 10 MG/1
10 TABLET ORAL DAILY
Qty: 90 TABLET | Refills: 3 | Status: SHIPPED | OUTPATIENT
Start: 2023-04-25 | End: 2023-04-25

## 2023-04-25 RX ORDER — AMLODIPINE BESYLATE 10 MG/1
10 TABLET ORAL DAILY
Qty: 90 TABLET | Refills: 3 | Status: SHIPPED | OUTPATIENT
Start: 2023-04-25 | End: 2024-01-29

## 2023-04-25 NOTE — ASSESSMENT & PLAN NOTE
"Patient denies any thoughts of hurting himself. "No, no,  I do not think like that".  Discussed medication options patient declined, does not want to start on any medications, offered to meet with behavior Health nurse practitioner to talk what on 1:1.   Notified patient the clinic is available if he needs to come back he can always call and if issues worsens he can go to nearest emergency department.    Also he can use his family support as getting in touch with his sister.  Questions solicited and answered, patient verbalized.    "

## 2023-04-25 NOTE — ASSESSMENT & PLAN NOTE
Discussed alcohol cessation, offered counseling services, patient declined.  Patient will notify the clinic when ready.  Patient to read discharge education material on alcohol and its impact on health.  Questions solicited and answered, patient verbalized.

## 2023-04-25 NOTE — PROGRESS NOTES
"Patient Name: Denis Ramon Jr.   : 1965  MRN: [id number]     SUBJECTIVE DATA:    CHIEF COMPLAINT:   Denis Ramon Jr. is a 57 y.o. male who presents to clinic today with Hypertension and Depression        HPI:  57-year-old male presents to the clinic for medication refill and discuss depression.  past medical history of HTN, Rt cerebellar infarct  (CVA) 2020, elevated liver enzymes, hepatic steatosis Hep C  followed by ID and alcoholism.    Depression:  PHQ-9 score is 11.  Patient state since his mother passing away in 2023 he has been living alone.  Patient state" she was my best friend" " smoke together" " we talked" " and now I live in this big house alone".  Patient denies any thoughts of hurting himself. "No, no,  I do not think like that".  Discussed medication options patient declined, does not want to start on any medications, offered to meet with behavior Health nurse practitioner to talk what on 1:1.  Patient state he is okay for now just things has changed for him.  Patient state he will make an appointment if he needs to.  Notified patient the clinic is available if he needs to come back he can always call and if issues worsens he can go to nearest emergency department.  Patient state" I have my sister"."  Anyway my sister is helping me move out and get me into a low income apartment" " and I already have the furniture" " my sister kids will moving and take care of the house".    Hypertension:  blood pressure 144/89 .  Patient state he has been out of his Norvasc 10 mg and lisinopril 20 mg for about 2 weeks because [facility] will not refill his medication.  Discussed with patient we need to switch his medication to [facility] pharmacy for better pricing and coverage.  Patient agreed medication refilled.  Continue metoprolol 50 mg, hydralazine 50 mg.  Declined need for refills at this time. Patient compliant with his medications.  Denies any chest pain or short of breath.        Patient declined " "colonoscopy, declined Cologuard, declined pneumococcal vaccine and shingles vaccine for now.     Patient not ready to quit smoking nor stop drinking alcohol despite education on smoking and alcohol hazard.  Patient states he is keeping up his follow-up appointment with other providers.     Patient denies chest pain, shortness of breath, dyspnea on exertion, palpitations, peripheral edema, abdominal pain, nausea, vomiting, diarrhea, constipation, fatigue, fever, chills, dysuria,  hematuria, melena, or hematochezia. HPI      ALLERGIES: Review of patient's allergies indicates:  No Known Allergies      ROS:  Review of Systems   Psychiatric/Behavioral:  Positive for depression.    All other systems reviewed and are negative.      OBJECTIVE DATA:  Vital signs  Vitals:    04/25/23 0746 04/25/23 0818   BP: (!) 153/94 (!) 144/89   Pulse: 68    Resp: 18    Temp: 97.7 °F (36.5 °C)    TempSrc: Oral    SpO2: 99%    Weight: 72.6 kg (160 lb)    Height: 5' 8" (1.727 m)       Body mass index is 24.33 kg/m².    PHYSICAL EXAM:   Physical Exam  Vitals and nursing note reviewed.   Constitutional:       General: He is awake. He is not in acute distress.     Appearance: Normal appearance. He is well-developed, well-groomed and normal weight. He is not ill-appearing, toxic-appearing or diaphoretic.   HENT:      Head: Normocephalic and atraumatic.      Right Ear: Tympanic membrane, ear canal and external ear normal.      Left Ear: Tympanic membrane, ear canal and external ear normal.      Nose: Nose normal. No congestion or rhinorrhea.      Right Nostril: No epistaxis.      Left Nostril: No epistaxis.      Right Turbinates: Not swollen.      Left Turbinates: Not swollen.      Mouth/Throat:      Lips: Pink.      Mouth: Mucous membranes are moist.      Pharynx: Oropharynx is clear. Uvula midline. No posterior oropharyngeal erythema.   Eyes:      General: Lids are normal. Scleral icterus present.      Extraocular Movements: Extraocular " movements intact.      Conjunctiva/sclera: Conjunctivae normal.      Pupils: Pupils are equal, round, and reactive to light.   Neck:      Trachea: Trachea and phonation normal.   Cardiovascular:      Rate and Rhythm: Normal rate and regular rhythm.      Pulses: Normal pulses.           Radial pulses are 2+ on the right side and 2+ on the left side.        Dorsalis pedis pulses are 2+ on the right side and 2+ on the left side.      Heart sounds: Normal heart sounds. No murmur heard.  Pulmonary:      Effort: Pulmonary effort is normal.      Breath sounds: Normal breath sounds and air entry. No wheezing or rhonchi.   Abdominal:      General: Abdomen is flat.      Palpations: Abdomen is soft.      Tenderness: There is no abdominal tenderness. There is no right CVA tenderness or left CVA tenderness.   Musculoskeletal:         General: Normal range of motion.      Cervical back: Normal range of motion and neck supple. No rigidity or tenderness.      Right lower leg: No edema.      Left lower leg: No edema.   Lymphadenopathy:      Cervical: No cervical adenopathy.   Skin:     General: Skin is warm.      Capillary Refill: Capillary refill takes less than 2 seconds.      Findings: No rash.   Neurological:      General: No focal deficit present.      Mental Status: He is alert and oriented to person, place, and time. Mental status is at baseline.      GCS: GCS eye subscore is 4. GCS verbal subscore is 5. GCS motor subscore is 6.      Cranial Nerves: Cranial nerves 2-12 are intact.      Sensory: Sensation is intact.      Motor: Motor function is intact.      Coordination: Coordination is intact.      Gait: Gait is intact. Gait normal.   Psychiatric:         Attention and Perception: Attention and perception normal.         Mood and Affect: Mood and affect normal.         Speech: Speech normal.         Behavior: Behavior normal. Behavior is cooperative.         Thought Content: Thought content normal.         Cognition and  "Memory: Cognition and memory normal.         Judgment: Judgment normal.        ASSESSMENT/PLAN:  1. Primary hypertension  Assessment & Plan:  Continue medications as prescribed.  Norvasc 10 mg p.o. once daily refilled.  Lisinopril 20 mg p.o. once daily refilled.  Stop smoking, stop drinking, patient to read discharge education materials.  Questions solicited and answered, patient verbalized.    Orders:  -     Discontinue: lisinopriL (PRINIVIL,ZESTRIL) 20 MG tablet; Take 1 tablet (20 mg total) by mouth once daily.  Dispense: 90 tablet; Refill: 3  -     Discontinue: amLODIPine (NORVASC) 10 MG tablet; Take 1 tablet (10 mg total) by mouth once daily.  Dispense: 90 tablet; Refill: 3  -     aspirin (ECOTRIN) 81 MG EC tablet; Take 1 tablet (81 mg total) by mouth once daily.  Dispense: 90 tablet; Refill: 3  -     amLODIPine (NORVASC) 10 MG tablet; Take 1 tablet (10 mg total) by mouth once daily.  Dispense: 90 tablet; Refill: 3  -     lisinopriL (PRINIVIL,ZESTRIL) 20 MG tablet; Take 1 tablet (20 mg total) by mouth once daily.  Dispense: 90 tablet; Refill: 3    2. Reactive depression  Assessment & Plan:  Patient denies any thoughts of hurting himself. "No, no,  I do not think like that".  Discussed medication options patient declined, does not want to start on any medications, offered to meet with behavior Health nurse practitioner to talk what on 1:1.   Notified patient the clinic is available if he needs to come back he can always call and if issues worsens he can go to nearest emergency department.    Also he can use his family support as getting in touch with his sister.  Questions solicited and answered, patient verbalized.        3. Tobacco dependence  Assessment & Plan:  Declines counseling, patient aware of service offered, patient will call the clinic to schedule.      4. Alcohol abuse  Assessment & Plan:  Discussed alcohol cessation, offered counseling services, patient declined.  Patient will notify the clinic when " ready.  Patient to read discharge education material on alcohol and its impact on health.  Questions solicited and answered, patient verbalized.      5. Encounter for medication refill  -     Discontinue: lisinopriL (PRINIVIL,ZESTRIL) 20 MG tablet; Take 1 tablet (20 mg total) by mouth once daily.  Dispense: 90 tablet; Refill: 3  -     Discontinue: amLODIPine (NORVASC) 10 MG tablet; Take 1 tablet (10 mg total) by mouth once daily.  Dispense: 90 tablet; Refill: 3  -     aspirin (ECOTRIN) 81 MG EC tablet; Take 1 tablet (81 mg total) by mouth once daily.  Dispense: 90 tablet; Refill: 3  -     amLODIPine (NORVASC) 10 MG tablet; Take 1 tablet (10 mg total) by mouth once daily.  Dispense: 90 tablet; Refill: 3  -     lisinopriL (PRINIVIL,ZESTRIL) 20 MG tablet; Take 1 tablet (20 mg total) by mouth once daily.  Dispense: 90 tablet; Refill: 3           RESULTS:  No results found for this or any previous visit (from the past 1008 hour(s)).      Follow Up:  Follow up in about 6 months (around 10/25/2023).      Previous medical history/lab work/radiology reviewed and considered during medical management decisions.   Medication list reviewed and medication reconciliation performed.  Patient was provided  and care about his/her current diagnosis (es) and medications including risk/benefit and side effects/adverse events, over the counter medication uses/doses, home self-care and contact precautions,  and red flags and indications for when to seek immediate medical attention.   Patient was advised to continue compliance with current medication list and medical recommendations.  Patient dvised continued compliance with recommended eating habits/ diets for medical conditions and exercise 150 minutes/ week (if possible) for medical condition (s).  Educational handouts and instructions on selected disease management in AVS (After Visit Summary).    All of the patient's questions were answered to patient's satisfaction.   The  patient was receptive, expressed verbal understanding and agreement the above plan.     This note was created with the assistance of a voice recognition software or phone dictation. There may be transcription errors as a result of using this technology however minimal. Effort has been made to assure accuracy of transcription but any obvious errors or omissions should be clarified with the author of the document

## 2023-04-25 NOTE — ASSESSMENT & PLAN NOTE
Continue medications as prescribed.  Norvasc 10 mg p.o. once daily refilled.  Lisinopril 20 mg p.o. once daily refilled.  Stop smoking, stop drinking, patient to read discharge education materials.  Questions solicited and answered, patient verbalized.

## 2023-05-05 ENCOUNTER — TELEPHONE (OUTPATIENT)
Dept: FAMILY MEDICINE | Facility: CLINIC | Age: 58
End: 2023-05-05
Payer: MEDICAID

## 2023-07-25 DIAGNOSIS — K29.80 DUODENITIS: Primary | ICD-10-CM

## 2023-07-25 DIAGNOSIS — A04.8 H. PYLORI INFECTION: ICD-10-CM

## 2023-07-25 RX ORDER — PANTOPRAZOLE SODIUM 40 MG/1
40 TABLET, DELAYED RELEASE ORAL DAILY
Qty: 30 TABLET | Refills: 2 | Status: SHIPPED | OUTPATIENT
Start: 2023-07-25 | End: 2024-07-24

## 2023-08-16 LAB
INR PPP: 1.1
PROTHROMBIN TIME: 13.5 SECONDS (ref 11.4–14)

## 2023-11-20 ENCOUNTER — TELEPHONE (OUTPATIENT)
Dept: FAMILY MEDICINE | Facility: CLINIC | Age: 58
End: 2023-11-20
Payer: MEDICAID

## 2023-11-21 ENCOUNTER — CLINICAL SUPPORT (OUTPATIENT)
Dept: FAMILY MEDICINE | Facility: CLINIC | Age: 58
End: 2023-11-21
Payer: MEDICAID

## 2023-11-21 VITALS — DIASTOLIC BLOOD PRESSURE: 81 MMHG | SYSTOLIC BLOOD PRESSURE: 134 MMHG

## 2023-11-21 DIAGNOSIS — I10 PRIMARY HYPERTENSION: Primary | ICD-10-CM

## 2023-11-21 PROCEDURE — 99211 OFF/OP EST MAY X REQ PHY/QHP: CPT | Mod: PBBFAC

## 2023-11-21 NOTE — PROGRESS NOTES
Denis Ramon Jr. 58 y.o. male is here today for Blood Pressure check.   History of HTN yes.    Review of patient's allergies indicates:  No Known Allergies  Creatinine   Date Value Ref Range Status   08/16/2022 0.89 0.73 - 1.18 mg/dL Final   09/24/2020 1.04 0.57 - 1.25 mg/dL Final     Sodium   Date Value Ref Range Status   09/24/2020 133 (L) 136 - 145 mmol/L Final     Sodium Level   Date Value Ref Range Status   08/16/2022 141 136 - 145 mmol/L Final     Potassium   Date Value Ref Range Status   09/24/2020 4.2 3.5 - 5.1 mmol/L Final     Potassium Level   Date Value Ref Range Status   08/16/2022 3.2 (L) 3.5 - 5.1 mmol/L Final   ]  Patient verifies taking blood pressure medications on a regular basis at the same time of the day.     Current Outpatient Medications:     amLODIPine (NORVASC) 10 MG tablet, Take 1 tablet (10 mg total) by mouth once daily., Disp: 90 tablet, Rfl: 3    aspirin (ECOTRIN) 81 MG EC tablet, Take 1 tablet (81 mg total) by mouth once daily., Disp: 90 tablet, Rfl: 3    atorvastatin (LIPITOR) 20 MG tablet, Take 1 tablet (20 mg total) by mouth once daily. At bedtime., Disp: 90 tablet, Rfl: 3    hydrALAZINE (APRESOLINE) 50 MG tablet, Take 1 tablet (50 mg total) by mouth once daily., Disp: 90 tablet, Rfl: 3    lisinopriL (PRINIVIL,ZESTRIL) 20 MG tablet, Take 1 tablet (20 mg total) by mouth once daily., Disp: 90 tablet, Rfl: 3    metoprolol tartrate (LOPRESSOR) 50 MG tablet, Take 1 tablet (50 mg total) by mouth once daily., Disp: 90 tablet, Rfl: 3    pantoprazole (PROTONIX) 40 MG tablet, Take 1 tablet (40 mg total) by mouth once daily., Disp: 30 tablet, Rfl: 2  Does patient have record of home blood pressure readings no. Readings have been averaging N/A.   Last dose of blood pressure medication was taken at 6:20 am.  Patient is asymptomatic.   Complains of N/a.    BP: 134/81 ,   .    Blood pressure reading after 15 minutes was 136/84, Pulse 74.  DINA Carcamo notified.

## 2023-12-11 ENCOUNTER — OFFICE VISIT (OUTPATIENT)
Dept: FAMILY MEDICINE | Facility: CLINIC | Age: 58
End: 2023-12-11
Payer: MEDICAID

## 2023-12-11 VITALS
TEMPERATURE: 98 F | HEIGHT: 68 IN | SYSTOLIC BLOOD PRESSURE: 112 MMHG | WEIGHT: 174 LBS | HEART RATE: 84 BPM | OXYGEN SATURATION: 97 % | RESPIRATION RATE: 18 BRPM | BODY MASS INDEX: 26.37 KG/M2 | DIASTOLIC BLOOD PRESSURE: 73 MMHG

## 2023-12-11 DIAGNOSIS — Z12.12 ENCOUNTER FOR COLORECTAL CANCER SCREENING: ICD-10-CM

## 2023-12-11 DIAGNOSIS — F17.200 TOBACCO DEPENDENCE: ICD-10-CM

## 2023-12-11 DIAGNOSIS — I10 PRIMARY HYPERTENSION: Primary | ICD-10-CM

## 2023-12-11 DIAGNOSIS — Z12.11 ENCOUNTER FOR COLORECTAL CANCER SCREENING: ICD-10-CM

## 2023-12-11 DIAGNOSIS — N52.2 DRUG-INDUCED ERECTILE DYSFUNCTION: ICD-10-CM

## 2023-12-11 DIAGNOSIS — Z12.2 ENCOUNTER FOR SCREENING FOR LUNG CANCER: ICD-10-CM

## 2023-12-11 PROCEDURE — 1159F MED LIST DOCD IN RCRD: CPT | Mod: CPTII,,, | Performed by: NURSE PRACTITIONER

## 2023-12-11 PROCEDURE — 99214 OFFICE O/P EST MOD 30 MIN: CPT | Mod: S$PBB,,, | Performed by: NURSE PRACTITIONER

## 2023-12-11 PROCEDURE — 1160F PR REVIEW ALL MEDS BY PRESCRIBER/CLIN PHARMACIST DOCUMENTED: ICD-10-PCS | Mod: CPTII,,, | Performed by: NURSE PRACTITIONER

## 2023-12-11 PROCEDURE — 3074F SYST BP LT 130 MM HG: CPT | Mod: CPTII,,, | Performed by: NURSE PRACTITIONER

## 2023-12-11 PROCEDURE — 99214 OFFICE O/P EST MOD 30 MIN: CPT | Mod: PBBFAC | Performed by: NURSE PRACTITIONER

## 2023-12-11 PROCEDURE — 4010F ACE/ARB THERAPY RXD/TAKEN: CPT | Mod: CPTII,,, | Performed by: NURSE PRACTITIONER

## 2023-12-11 PROCEDURE — 1160F RVW MEDS BY RX/DR IN RCRD: CPT | Mod: CPTII,,, | Performed by: NURSE PRACTITIONER

## 2023-12-11 PROCEDURE — 3074F PR MOST RECENT SYSTOLIC BLOOD PRESSURE < 130 MM HG: ICD-10-PCS | Mod: CPTII,,, | Performed by: NURSE PRACTITIONER

## 2023-12-11 PROCEDURE — 4010F PR ACE/ARB THEARPY RXD/TAKEN: ICD-10-PCS | Mod: CPTII,,, | Performed by: NURSE PRACTITIONER

## 2023-12-11 PROCEDURE — 3008F BODY MASS INDEX DOCD: CPT | Mod: CPTII,,, | Performed by: NURSE PRACTITIONER

## 2023-12-11 PROCEDURE — 3008F PR BODY MASS INDEX (BMI) DOCUMENTED: ICD-10-PCS | Mod: CPTII,,, | Performed by: NURSE PRACTITIONER

## 2023-12-11 PROCEDURE — 1159F PR MEDICATION LIST DOCUMENTED IN MEDICAL RECORD: ICD-10-PCS | Mod: CPTII,,, | Performed by: NURSE PRACTITIONER

## 2023-12-11 PROCEDURE — 99214 PR OFFICE/OUTPT VISIT, EST, LEVL IV, 30-39 MIN: ICD-10-PCS | Mod: S$PBB,,, | Performed by: NURSE PRACTITIONER

## 2023-12-11 PROCEDURE — 3078F PR MOST RECENT DIASTOLIC BLOOD PRESSURE < 80 MM HG: ICD-10-PCS | Mod: CPTII,,, | Performed by: NURSE PRACTITIONER

## 2023-12-11 PROCEDURE — 3078F DIAST BP <80 MM HG: CPT | Mod: CPTII,,, | Performed by: NURSE PRACTITIONER

## 2023-12-11 RX ORDER — PANTOPRAZOLE SODIUM 40 MG/1
40 TABLET, DELAYED RELEASE ORAL DAILY
Qty: 30 TABLET | Refills: 2 | Status: CANCELLED | OUTPATIENT
Start: 2023-12-11 | End: 2024-12-10

## 2023-12-11 RX ORDER — METOPROLOL TARTRATE 50 MG/1
50 TABLET ORAL 2 TIMES DAILY
Qty: 180 TABLET | Refills: 3 | Status: SHIPPED | OUTPATIENT
Start: 2023-12-11

## 2023-12-11 RX ORDER — HYDRALAZINE HYDROCHLORIDE 50 MG/1
50 TABLET, FILM COATED ORAL DAILY
Qty: 90 TABLET | Refills: 3 | Status: SHIPPED | OUTPATIENT
Start: 2023-12-11

## 2023-12-11 RX ORDER — CHLORHEXIDINE GLUCONATE ORAL RINSE 1.2 MG/ML
15 SOLUTION DENTAL 2 TIMES DAILY
COMMUNITY
Start: 2023-06-20

## 2023-12-11 RX ORDER — HYDROCODONE BITARTRATE AND ACETAMINOPHEN 5; 325 MG/1; MG/1
1 TABLET ORAL
COMMUNITY
Start: 2023-06-20 | End: 2024-02-02 | Stop reason: ALTCHOICE

## 2023-12-11 RX ORDER — SILDENAFIL 50 MG/1
50 TABLET, FILM COATED ORAL DAILY PRN
Qty: 30 TABLET | Refills: 1 | Status: SHIPPED | OUTPATIENT
Start: 2023-12-11

## 2023-12-11 RX ORDER — LISINOPRIL 20 MG/1
20 TABLET ORAL DAILY
Qty: 90 TABLET | Refills: 3 | Status: SHIPPED | OUTPATIENT
Start: 2023-12-11

## 2023-12-11 RX ORDER — LISINOPRIL 20 MG/1
20 TABLET ORAL DAILY
Qty: 90 TABLET | Refills: 3 | Status: CANCELLED | OUTPATIENT
Start: 2023-12-11

## 2023-12-11 NOTE — ASSESSMENT & PLAN NOTE
Controlled.  Current blood pressure 112/73.  Patient currently takes metoprolol 50 mg p.o. b.i.d. daily, lisinopril 20 mg p.o. once daily, hydralazine 50 mg p.o. b.i.d. daily, Norvasc 10 mg p.o. once daily.  Continue medications as discussed.  Follow low-salt diet less than 3 g per day if Possible.  Keep fiber intake 30 g of possible, stay hydrated with water.  Stay physically active at least 30 minutes a day up to 5 days a week as simple as brisk walking.  Stop drinking alcohol, stop smoking.  Questions solicited and answered, patient verbalized and agreed to plan.

## 2023-12-11 NOTE — PROGRESS NOTES
Patient Name: Denis Ramon Jr.   : 1965  MRN: [id number]     SUBJECTIVE DATA:    CHIEF COMPLAINT:   Denis Ramon Jr. is a 58 y.o. male who presents to clinic today with Hypertension        HPI:  58-year-old male presents to the clinic to follow-up on hypertension and also requesting medication for erectile dysfunction.    Hypertension:  Controlled.  Current blood pressure 112/73.  Patient currently takes metoprolol 50 mg p.o. b.i.d. daily, lisinopril 20 mg p.o. once daily, hydralazine 50 mg p.o. b.i.d. daily, Norvasc 10 mg p.o. once daily.  Continue medications as discussed.  Follow low-salt diet less than 3 g per day if Possible.  Keep fiber intake 30 g of possible, stay hydrated with water.  Stay physically active at least 30 minutes a day up to 5 days a week as simple as brisk walking.  Stop drinking alcohol, stop smoking.  Questions solicited and answered, patient verbalized and agreed to plan.    Erectile dysfunction:  Patient state he has been having problems keeping his erection as well starting the erection.  Patient state he has having a hard time having sex due to his erection problems.  Discussed with patient this is due to medication that he is taking which can affect his erection such as blood pressure medication.  Discussed Rx Viagra 10 mg p.o. 30-60 minutes before his 1st dose.  Discussed side effects such as hypertension, headaches, joint pain, diarrhea and nasal congestion.  Please notify the clinic if you having issue with the medication.  Questions solicited and answered, patient verbalized and agreed to plan.    Care gaps:  Patient agreed to complete a fecal occult immunoassay colorectal cancer screening.  Patient agreed to complete CT lung scan screening.  Patient declined smoke cessation.      Patient denies chest pain, shortness of breath, dyspnea on exertion, palpitations, peripheral edema, abdominal pain, nausea, vomiting, diarrhea, constipation, fatigue, fever, chills, dysuria,   "hematuria, melena, or hematochezia.     ALLERGIES: Review of patient's allergies indicates:  No Known Allergies      ROS:  Review of Systems   Genitourinary:         Erectile dysfunction.   All other systems reviewed and are negative.        OBJECTIVE DATA:  Vital signs  Vitals:    12/11/23 1232   BP: 112/73   Pulse: 84   Resp: 18   Temp: 97.8 °F (36.6 °C)   TempSrc: Oral   SpO2: 97%   Weight: 78.9 kg (174 lb)   Height: 5' 8" (1.727 m)      Body mass index is 26.46 kg/m².    PHYSICAL EXAM:   Physical Exam  Vitals and nursing note reviewed.   Constitutional:       General: He is awake. He is not in acute distress.     Appearance: Normal appearance. He is well-developed, well-groomed and overweight. He is not ill-appearing, toxic-appearing or diaphoretic.   HENT:      Head: Normocephalic and atraumatic.      Right Ear: Tympanic membrane, ear canal and external ear normal.      Left Ear: Tympanic membrane, ear canal and external ear normal.      Nose: Nose normal.      Mouth/Throat:      Mouth: Mucous membranes are moist.      Pharynx: Oropharynx is clear. Uvula midline.   Eyes:      General: Lids are normal. No scleral icterus.     Extraocular Movements: Extraocular movements intact.      Conjunctiva/sclera: Conjunctivae normal.      Pupils: Pupils are equal, round, and reactive to light.   Neck:      Trachea: Trachea and phonation normal.   Cardiovascular:      Rate and Rhythm: Normal rate and regular rhythm.      Pulses: Normal pulses.           Radial pulses are 2+ on the right side and 2+ on the left side.      Heart sounds: Normal heart sounds. No murmur heard.  Pulmonary:      Effort: Pulmonary effort is normal.      Breath sounds: Normal breath sounds and air entry. No wheezing or rhonchi.   Abdominal:      Palpations: Abdomen is soft.   Musculoskeletal:         General: Normal range of motion.      Cervical back: Normal range of motion and neck supple. No rigidity or tenderness.      Right lower leg: No edema. "      Left lower leg: No edema.   Lymphadenopathy:      Cervical: No cervical adenopathy.   Skin:     General: Skin is warm.      Capillary Refill: Capillary refill takes less than 2 seconds.   Neurological:      General: No focal deficit present.      Mental Status: He is alert and oriented to person, place, and time. Mental status is at baseline.      GCS: GCS eye subscore is 4. GCS verbal subscore is 5. GCS motor subscore is 6.      Cranial Nerves: No cranial nerve deficit.      Sensory: No sensory deficit.      Motor: No weakness.      Coordination: Coordination normal.      Gait: Gait normal.   Psychiatric:         Attention and Perception: Attention and perception normal.         Mood and Affect: Mood and affect normal.         Speech: Speech normal.         Behavior: Behavior normal. Behavior is cooperative.         Thought Content: Thought content normal.         Cognition and Memory: Cognition and memory normal.         Judgment: Judgment normal.          ASSESSMENT/PLAN:  1. Primary hypertension  Assessment & Plan:   Controlled.  Current blood pressure 112/73.  Patient currently takes metoprolol 50 mg p.o. b.i.d. daily, lisinopril 20 mg p.o. once daily, hydralazine 50 mg p.o. b.i.d. daily, Norvasc 10 mg p.o. once daily.  Continue medications as discussed.  Follow low-salt diet less than 3 g per day if Possible.  Keep fiber intake 30 g of possible, stay hydrated with water.  Stay physically active at least 30 minutes a day up to 5 days a week as simple as brisk walking.  Stop drinking alcohol, stop smoking.  Questions solicited and answered, patient verbalized and agreed to plan.    Orders:  -     hydrALAZINE (APRESOLINE) 50 MG tablet; Take 1 tablet (50 mg total) by mouth once daily.  Dispense: 90 tablet; Refill: 3  -     metoprolol tartrate (LOPRESSOR) 50 MG tablet; Take 1 tablet (50 mg total) by mouth 2 (two) times daily.  Dispense: 180 tablet; Refill: 3  -     lisinopriL (PRINIVIL,ZESTRIL) 20 MG tablet;  Take 1 tablet (20 mg total) by mouth once daily.  Dispense: 90 tablet; Refill: 3    2. Drug-induced erectile dysfunction  Assessment & Plan:   Patient state he has been having problems keeping his erection as well starting the erection.  Patient state he has having a hard time having sex due to his erection problems.  Discussed with patient this is due to medication that he is taking which can affect his erection such as blood pressure medication.  Discussed Rx Viagra 10 mg p.o. 30-60 minutes before his 1st dose.  Discussed side effects such as hypertension, headaches, joint pain, diarrhea and nasal congestion.  Please notify the clinic if you having issue with the medication.  Questions solicited and answered, patient verbalized and agreed to plan.    Orders:  -     sildenafiL (VIAGRA) 50 MG tablet; Take 1 tablet (50 mg total) by mouth daily as needed for Erectile Dysfunction.  Dispense: 30 tablet; Refill: 1    3. Tobacco dependence  Assessment & Plan:  Patient continue to declined smoke cessation counseling despite educated patient on risk of tobacco use on health.  Instructed patient when ready to notify the clinic for referral.  Patient verbalized.    Orders:  -     CT Chest Lung Screening Low Dose; Future; Expected date: 12/11/2023    4. Encounter for colorectal cancer screening  -     OCCULT BLOOD FECAL IMMUNOASSAY; Future; Expected date: 12/11/2023    5. Encounter for screening for lung cancer  -     CT Chest Lung Screening Low Dose; Future; Expected date: 12/11/2023           RESULTS:  No results found for this or any previous visit (from the past 1008 hour(s)).      Follow Up:  Follow up in about 6 weeks (around 1/24/2024).     Face to face, including documentation, chart review, counseling, education, review of test results, relevant medical records, and coordination of care.   I have explained the treatment plan, diagnosis, and prognosis to patient. All questions are answered to the best of my  knowledge.     Previous medical history/lab work/radiology reviewed and considered during medical management decisions.   Medication list reviewed and medication reconciliation performed.  Patient was provided  and care about his/her current diagnosis (es) and medications including risk/benefit and side effects/adverse events, over the counter medication uses/doses, home self-care and contact precautions,  and red flags and indications for when to seek immediate medical attention.   Patient was advised to continue compliance with current medication list and medical recommendations.  Patient dvised continued compliance with recommended eating habits/ diets for medical conditions and exercise 150 minutes/ week (if possible) for medical condition (s).  Educational handouts and instructions on selected disease management in AVS (After Visit Summary).    All of the patient's questions were answered to patient's satisfaction.   The patient was receptive, expressed verbal understanding and agreement the above plan.     This note was created with the assistance of a voice recognition software or phone dictation. There may be transcription errors as a result of using this technology however minimal. Effort has been made to assure accuracy of transcription but any obvious errors or omissions should be clarified with the author of the document

## 2023-12-11 NOTE — ASSESSMENT & PLAN NOTE
Patient continue to declined smoke cessation counseling despite educated patient on risk of tobacco use on health.  Instructed patient when ready to notify the clinic for referral.  Patient verbalized.

## 2023-12-11 NOTE — PATIENT INSTRUCTIONS
Nsih Barrios,     If you are due for any health screening(s) below please notify me so we can arrange them to be ordered and scheduled. Most healthy patients at your age complete them, but you are free to accept or refuse.     If you can't do it, I'll definitely understand. If you can, I'd certainly appreciate it!    Tests to Keep You Healthy    Colon Cancer Screening: ORDERED  Last Blood Pressure <= 139/89 (12/11/2023): Yes  Tobacco Cessation: NO      Its time for your colon cancer screening     Colorectal cancer is one of the leading causes of cancer death for men and women but it doesnt have to be. Screenings can prevent colorectal cancer or find it early enough to treat and cure the disease.     Our records indicate that you may be overdue for colon cancer screening. A colonoscopy or stool screening test can help identify patients at risk for developing colon cancer. Cancer screenings save lives, so schedule yours today to stay healthy.     A colonoscopy is the preferred test for detecting colon cancer. It is needed only once every 10 years if results are negative. While you are sedated, a flexible, lighted tube with a tiny camera is inserted into the rectum and advanced through the colon to look for cancers.     An alternative screening test that is used at home and returned to the lab may also be used. It detects hidden blood in bowel movements which could indicate cancer in the colon. If results are positive, you will need a colonoscopy to determine if the blood is a sign of cancer. This type of follow up (diagnostic) colonoscopy usually requires additional copays as required by your insurance provider.     If you recently had your colon cancer screening performed outside of [facility], please let your Health care team know so that they can update your health record. Please contact your PCP if you have any questions.    Were here to help you quit smoking     Our records indicated that you are still  smoking. One of the best things you can do for your health is to stop smoking and we are here to help.     Talk with your provider about our Smoking Cessation Program and how we can support you on your journey.

## 2023-12-11 NOTE — ASSESSMENT & PLAN NOTE
Patient state he has been having problems keeping his erection as well starting the erection.  Patient state he has having a hard time having sex due to his erection problems.  Discussed with patient this is due to medication that he is taking which can affect his erection such as blood pressure medication.  Discussed Rx Viagra 10 mg p.o. 30-60 minutes before his 1st dose.  Discussed side effects such as hypertension, headaches, joint pain, diarrhea and nasal congestion.  Please notify the clinic if you having issue with the medication.  Questions solicited and answered, patient verbalized and agreed to plan.

## 2023-12-14 LAB
OB IA INT NEG CNTRL (OHS): NEGATIVE
OB IA INT POS CNTRL (OHS): POSITIVE
OCCULT BLD IA (OHS): POSITIVE

## 2023-12-14 PROCEDURE — 82274 ASSAY TEST FOR BLOOD FECAL: CPT | Performed by: NURSE PRACTITIONER

## 2023-12-15 DIAGNOSIS — R19.5 FECAL OCCULT BLOOD TEST POSITIVE: Primary | ICD-10-CM

## 2023-12-15 NOTE — PROGRESS NOTES
Please notify patient occult blood fecal immunoassay came back positive.  He has been referred to Gastroenterology for colonoscopy.  Patient expect a phone call to schedule an appointment.

## 2023-12-18 ENCOUNTER — TELEPHONE (OUTPATIENT)
Dept: FAMILY MEDICINE | Facility: CLINIC | Age: 58
End: 2023-12-18
Payer: MEDICAID

## 2023-12-18 NOTE — TELEPHONE ENCOUNTER
----- Message from DM Jacobs sent at 12/15/2023  8:40 AM CST -----  Please notify patient occult blood fecal immunoassay came back positive.  He has been referred to Gastroenterology for colonoscopy.  Patient expect a phone call to schedule an appointment.

## 2023-12-19 ENCOUNTER — TELEPHONE (OUTPATIENT)
Dept: FAMILY MEDICINE | Facility: CLINIC | Age: 58
End: 2023-12-19
Payer: MEDICAID

## 2023-12-21 ENCOUNTER — TELEPHONE (OUTPATIENT)
Dept: FAMILY MEDICINE | Facility: CLINIC | Age: 58
End: 2023-12-21
Payer: MEDICAID

## 2023-12-21 NOTE — TELEPHONE ENCOUNTER
Called patient with results. There was no answer x 3. Left voicemail to call back x3.  Letter will be mailed.        ----- Message from DM Jacobs sent at 12/15/2023  8:40 AM CST -----  Please notify patient occult blood fecal immunoassay came back positive.  He has been referred to Gastroenterology for colonoscopy.  Patient expect a phone call to schedule an appointment.

## 2024-01-27 DIAGNOSIS — I10 PRIMARY HYPERTENSION: ICD-10-CM

## 2024-01-27 DIAGNOSIS — Z76.0 ENCOUNTER FOR MEDICATION REFILL: ICD-10-CM

## 2024-01-29 RX ORDER — AMLODIPINE BESYLATE 10 MG/1
10 TABLET ORAL
Qty: 90 TABLET | Refills: 3 | Status: SHIPPED | OUTPATIENT
Start: 2024-01-29

## 2024-01-29 RX ORDER — ATORVASTATIN CALCIUM 20 MG/1
TABLET, FILM COATED ORAL
Qty: 90 TABLET | Refills: 3 | Status: SHIPPED | OUTPATIENT
Start: 2024-01-29

## 2024-02-02 ENCOUNTER — OFFICE VISIT (OUTPATIENT)
Dept: FAMILY MEDICINE | Facility: CLINIC | Age: 59
End: 2024-02-02
Payer: MEDICAID

## 2024-02-02 VITALS
SYSTOLIC BLOOD PRESSURE: 133 MMHG | HEART RATE: 82 BPM | OXYGEN SATURATION: 98 % | TEMPERATURE: 98 F | DIASTOLIC BLOOD PRESSURE: 84 MMHG | HEIGHT: 68 IN | RESPIRATION RATE: 18 BRPM | BODY MASS INDEX: 25.76 KG/M2 | WEIGHT: 170 LBS

## 2024-02-02 DIAGNOSIS — R19.5 POSITIVE FIT (FECAL IMMUNOCHEMICAL TEST): ICD-10-CM

## 2024-02-02 DIAGNOSIS — Z12.5 ENCOUNTER FOR PROSTATE CANCER SCREENING: ICD-10-CM

## 2024-02-02 DIAGNOSIS — Z00.00 ANNUAL PHYSICAL EXAM: Primary | ICD-10-CM

## 2024-02-02 LAB
ALBUMIN SERPL-MCNC: 4.1 G/DL (ref 3.5–5)
ALBUMIN/GLOB SERPL: 1.1 RATIO (ref 1.1–2)
ALP SERPL-CCNC: 60 UNIT/L (ref 40–150)
ALT SERPL-CCNC: 121 UNIT/L (ref 0–55)
APPEARANCE UR: CLEAR
AST SERPL-CCNC: 123 UNIT/L (ref 5–34)
BACTERIA #/AREA URNS AUTO: ABNORMAL /HPF
BASOPHILS # BLD AUTO: 0.03 X10(3)/MCL
BASOPHILS NFR BLD AUTO: 0.3 %
BILIRUB SERPL-MCNC: 0.9 MG/DL
BILIRUB UR QL STRIP.AUTO: NEGATIVE
BUN SERPL-MCNC: 6.7 MG/DL (ref 8.4–25.7)
CALCIUM SERPL-MCNC: 9.5 MG/DL (ref 8.4–10.2)
CHLORIDE SERPL-SCNC: 105 MMOL/L (ref 98–107)
CO2 SERPL-SCNC: 26 MMOL/L (ref 22–29)
COLOR UR AUTO: COLORLESS
CREAT SERPL-MCNC: 1.04 MG/DL (ref 0.73–1.18)
EOSINOPHIL # BLD AUTO: 0.08 X10(3)/MCL (ref 0–0.9)
EOSINOPHIL NFR BLD AUTO: 0.9 %
ERYTHROCYTE [DISTWIDTH] IN BLOOD BY AUTOMATED COUNT: 11.1 % (ref 11.5–17)
GFR SERPLBLD CREATININE-BSD FMLA CKD-EPI: >60 MLS/MIN/1.73/M2
GLOBULIN SER-MCNC: 3.7 GM/DL (ref 2.4–3.5)
GLUCOSE SERPL-MCNC: 84 MG/DL (ref 74–100)
GLUCOSE UR QL STRIP.AUTO: NORMAL
HCT VFR BLD AUTO: 42.7 % (ref 42–52)
HGB BLD-MCNC: 14.9 G/DL (ref 14–18)
HYALINE CASTS #/AREA URNS LPF: ABNORMAL /LPF
IMM GRANULOCYTES # BLD AUTO: 0.05 X10(3)/MCL (ref 0–0.04)
IMM GRANULOCYTES NFR BLD AUTO: 0.5 %
KETONES UR QL STRIP.AUTO: NEGATIVE
LEUKOCYTE ESTERASE UR QL STRIP.AUTO: NEGATIVE
LYMPHOCYTES # BLD AUTO: 3.34 X10(3)/MCL (ref 0.6–4.6)
LYMPHOCYTES NFR BLD AUTO: 35.9 %
MCH RBC QN AUTO: 33.3 PG (ref 27–31)
MCHC RBC AUTO-ENTMCNC: 34.9 G/DL (ref 33–36)
MCV RBC AUTO: 95.5 FL (ref 80–94)
MONOCYTES # BLD AUTO: 1.34 X10(3)/MCL (ref 0.1–1.3)
MONOCYTES NFR BLD AUTO: 14.4 %
NEUTROPHILS # BLD AUTO: 4.46 X10(3)/MCL (ref 2.1–9.2)
NEUTROPHILS NFR BLD AUTO: 48 %
NITRITE UR QL STRIP.AUTO: NEGATIVE
NRBC BLD AUTO-RTO: 0 %
PH UR STRIP.AUTO: 5 [PH]
PLATELET # BLD AUTO: 259 X10(3)/MCL (ref 130–400)
PMV BLD AUTO: 8.8 FL (ref 7.4–10.4)
POTASSIUM SERPL-SCNC: 3.8 MMOL/L (ref 3.5–5.1)
PROT SERPL-MCNC: 7.8 GM/DL (ref 6.4–8.3)
PROT UR QL STRIP.AUTO: NEGATIVE
PSA SERPL-MCNC: 0.49 NG/ML
RBC # BLD AUTO: 4.47 X10(6)/MCL (ref 4.7–6.1)
RBC #/AREA URNS AUTO: ABNORMAL /HPF
RBC UR QL AUTO: ABNORMAL
SODIUM SERPL-SCNC: 139 MMOL/L (ref 136–145)
SP GR UR STRIP.AUTO: <1.005 (ref 1–1.03)
SQUAMOUS #/AREA URNS LPF: ABNORMAL /HPF
T4 FREE SERPL-MCNC: 0.97 NG/DL (ref 0.7–1.48)
TSH SERPL-ACNC: 0.07 UIU/ML (ref 0.35–4.94)
UROBILINOGEN UR STRIP-ACNC: NORMAL
WBC # SPEC AUTO: 9.3 X10(3)/MCL (ref 4.5–11.5)
WBC #/AREA URNS AUTO: ABNORMAL /HPF

## 2024-02-02 PROCEDURE — 3008F BODY MASS INDEX DOCD: CPT | Mod: CPTII,,, | Performed by: NURSE PRACTITIONER

## 2024-02-02 PROCEDURE — 84443 ASSAY THYROID STIM HORMONE: CPT | Performed by: NURSE PRACTITIONER

## 2024-02-02 PROCEDURE — 81001 URINALYSIS AUTO W/SCOPE: CPT | Performed by: NURSE PRACTITIONER

## 2024-02-02 PROCEDURE — 99215 OFFICE O/P EST HI 40 MIN: CPT | Mod: PBBFAC | Performed by: NURSE PRACTITIONER

## 2024-02-02 PROCEDURE — 80053 COMPREHEN METABOLIC PANEL: CPT | Performed by: NURSE PRACTITIONER

## 2024-02-02 PROCEDURE — 36415 COLL VENOUS BLD VENIPUNCTURE: CPT | Performed by: NURSE PRACTITIONER

## 2024-02-02 PROCEDURE — 3079F DIAST BP 80-89 MM HG: CPT | Mod: CPTII,,, | Performed by: NURSE PRACTITIONER

## 2024-02-02 PROCEDURE — 99396 PREV VISIT EST AGE 40-64: CPT | Mod: S$PBB,,, | Performed by: NURSE PRACTITIONER

## 2024-02-02 PROCEDURE — 85025 COMPLETE CBC W/AUTO DIFF WBC: CPT | Performed by: NURSE PRACTITIONER

## 2024-02-02 PROCEDURE — 3075F SYST BP GE 130 - 139MM HG: CPT | Mod: CPTII,,, | Performed by: NURSE PRACTITIONER

## 2024-02-02 PROCEDURE — 84439 ASSAY OF FREE THYROXINE: CPT | Performed by: NURSE PRACTITIONER

## 2024-02-02 PROCEDURE — 1159F MED LIST DOCD IN RCRD: CPT | Mod: CPTII,,, | Performed by: NURSE PRACTITIONER

## 2024-02-02 PROCEDURE — 84153 ASSAY OF PSA TOTAL: CPT | Performed by: NURSE PRACTITIONER

## 2024-02-02 NOTE — PATIENT INSTRUCTIONS
Nish Barrios,     If you are due for any health screening(s) below please notify me so we can arrange them to be ordered and scheduled. Most healthy patients at your age complete them, but you are free to accept or refuse.     If you can't do it, I'll definitely understand. If you can, I'd certainly appreciate it!    All of your core healthy metrics are met.      Were here to help you quit smoking     Our records indicated that you are still smoking. One of the best things you can do for your health is to stop smoking and we are here to help.     Talk with your provider about our Smoking Cessation Program and how we can support you on your journey.

## 2024-02-02 NOTE — PROGRESS NOTES
Patient Name: Denis Ramon Jr.   : 1965  MRN: [id number]     SUBJECTIVE DATA:    CHIEF COMPLAINT:   Denis Ramon Jr. is a 58 y.o. male who presents to clinic today with Annual Exam        HPI:  58-year-old male presents to the clinic to complete his yearly wellness exam and lab work.  Denies any concerns or issues at this time.    Social Determinants of Health     Tobacco Use: High Risk (2024)    Patient History     Smoking Tobacco Use: Every Day     Smokeless Tobacco Use: Never     Passive Exposure: Not on file   Alcohol Use: Heavy Drinker (2023)    AUDIT-C     Frequency of Alcohol Consumption: 4 or more times a week     Average Number of Drinks: Not on file     Frequency of Binge Drinking: Daily or almost daily   Financial Resource Strain: Low Risk  (2023)    Overall Financial Resource Strain (CARDIA)     Difficulty of Paying Living Expenses: Not hard at all   Food Insecurity: No Food Insecurity (2023)    Hunger Vital Sign     Worried About Running Out of Food in the Last Year: Never true     Ran Out of Food in the Last Year: Never true   Transportation Needs: No Transportation Needs (2023)    PRAPARE - Transportation     Lack of Transportation (Medical): No     Lack of Transportation (Non-Medical): No   Physical Activity: Insufficiently Active (2023)    Exercise Vital Sign     Days of Exercise per Week: 2 days     Minutes of Exercise per Session: 20 min   Stress: No Stress Concern Present (2023)    [employer] [place] [employer] - Occupational Stress Questionnaire     Feeling of Stress : Not at all   Social Connections: Socially Isolated (2023)    Social Connection and Isolation Panel [NHANES]     Frequency of Communication with Friends and Family: Twice a week     Frequency of Social Gatherings with Friends and Family: Once a week     Attends [employer] Services: Never     Active Member of Clubs or Organizations: No     Attends Club or Organization Meetings:  "Never     Marital Status: Never    Housing Stability: Low Risk  (4/25/2023)    Housing Stability Vital Sign     Unable to Pay for Housing in the Last Year: No     Number of Places Lived in the Last Year: 1     Unstable Housing in the Last Year: No   Depression: Low Risk  (2/2/2024)    Depression     Last PHQ-4: Flowsheet Data: 0     Car safety:  Seat belt used all the time.  Water:  Stay hydrated with fluids, drink 6-8 cups of water daily.  Tobacco use:  Declines smoke cessation.  Alcohol:  Drink in moderation.  Patient to read discharge education material on alcohol abuse and side effects on health.  Declines the need to stop alcohol.  Patient insists on continuing drinking alcohol and to stop asking him regarding alcohol stop.  Exercise: exercise 30 minutes a day up to 5 days a week.  Stay active.   Nutrition:  Follow low-cholesterol, low-fat, low-salt diet.  Eat fresh fruits and vegetables.  Keep in mind portion size.  Dental:  Patient does follow-up with a dentist yearly.  Ophthalmology:  Patient does follow-up with ophthalmologist yearly.  Immunizations:  Declines immunizations.  Inform patient he can go to any pharmacy for completion or return to clinic.  Patient verbalized  Lab work drawn today will notify of test results when they become available.     Patient denies chest pain, shortness of breath, dyspnea on exertion, palpitations, peripheral edema, abdominal pain, nausea, vomiting, diarrhea, constipation, fatigue, fever, chills, dysuria,  hematuria, melena, or hematochezia.        ALLERGIES: Review of patient's allergies indicates:  No Known Allergies      ROS:  Review of Systems   All other systems reviewed and are negative.        OBJECTIVE DATA:  Vital signs  Vitals:    02/02/24 1111 02/02/24 1133   BP: (!) 145/82 133/84   BP Location:  Right arm   Pulse: 81 82   Resp: 18    Temp: 97.5 °F (36.4 °C)    TempSrc: Oral    SpO2: 98%    Weight: 77.1 kg (170 lb)    Height: 5' 8" (1.727 m)       Body " mass index is 25.85 kg/m².    PHYSICAL EXAM:   Physical Exam  Vitals and nursing note reviewed.   Constitutional:       General: He is awake. He is not in acute distress.     Appearance: Normal appearance. He is well-developed, well-groomed and normal weight. He is not ill-appearing, toxic-appearing or diaphoretic.   HENT:      Head: Normocephalic and atraumatic.      Right Ear: Tympanic membrane, ear canal and external ear normal.      Left Ear: Tympanic membrane, ear canal and external ear normal.      Nose: Nose normal.      Mouth/Throat:      Mouth: Mucous membranes are moist.      Pharynx: Oropharynx is clear. Uvula midline.   Eyes:      General: Lids are normal. Gaze aligned appropriately. No scleral icterus.     Extraocular Movements: Extraocular movements intact.      Conjunctiva/sclera: Conjunctivae normal.      Pupils: Pupils are equal, round, and reactive to light.   Neck:      Thyroid: No thyroid mass, thyromegaly or thyroid tenderness.      Vascular: No carotid bruit.      Trachea: Trachea and phonation normal.   Cardiovascular:      Rate and Rhythm: Normal rate and regular rhythm.      Pulses: Normal pulses.           Carotid pulses are 2+ on the right side and 2+ on the left side.       Radial pulses are 2+ on the right side and 2+ on the left side.        Femoral pulses are 2+ on the right side and 2+ on the left side.       Dorsalis pedis pulses are 2+ on the right side and 2+ on the left side.        Posterior tibial pulses are 2+ on the right side and 2+ on the left side.      Heart sounds: Normal heart sounds. No murmur heard.  Pulmonary:      Effort: Pulmonary effort is normal.      Breath sounds: Normal breath sounds and air entry. No wheezing or rhonchi.   Abdominal:      General: Bowel sounds are normal. There is no distension.      Palpations: Abdomen is soft. There is no mass.      Tenderness: There is no abdominal tenderness. There is no right CVA tenderness, left CVA tenderness, guarding  or rebound.      Hernia: A hernia is present.   Genitourinary:     Comments: Declines physical assessment.  Declines digital rectal exam.  Musculoskeletal:         General: Normal range of motion.      Cervical back: Normal range of motion and neck supple. No rigidity or tenderness.      Right lower leg: No edema.      Left lower leg: No edema.   Lymphadenopathy:      Cervical: No cervical adenopathy.   Skin:     General: Skin is warm.      Capillary Refill: Capillary refill takes less than 2 seconds.      Findings: No rash.   Neurological:      General: No focal deficit present.      Mental Status: He is alert and oriented to person, place, and time. Mental status is at baseline.      GCS: GCS eye subscore is 4. GCS verbal subscore is 5. GCS motor subscore is 6.      Cranial Nerves: Cranial nerves 2-12 are intact. No cranial nerve deficit.      Sensory: Sensation is intact. No sensory deficit.      Motor: Motor function is intact. No weakness.      Coordination: Coordination is intact. Coordination normal.      Gait: Gait is intact. Gait normal.   Psychiatric:         Attention and Perception: Attention and perception normal.         Mood and Affect: Mood and affect normal.         Speech: Speech normal.         Behavior: Behavior normal. Behavior is cooperative.         Thought Content: Thought content normal.         Cognition and Memory: Cognition and memory normal.         Judgment: Judgment normal.          ASSESSMENT/PLAN:  1. Annual physical exam  -     PSA, Screening  -     CBC Auto Differential  -     Comprehensive Metabolic Panel  -     TSH  -     Urinalysis, Reflex to Urine Culture  -     T4, Free    2. Positive FIT (fecal immunochemical test)  -     Ambulatory referral/consult to Gastroenterology; Future; Expected date: 02/09/2024    3. Encounter for prostate cancer screening  -     PSA, Screening           RESULTS:  Recent Results (from the past 1008 hour(s))   PSA, Screening    Collection Time:  02/02/24 11:40 AM   Result Value Ref Range    Prostate Specific Antigen 0.49 <=4.00 ng/mL   Comprehensive Metabolic Panel    Collection Time: 02/02/24 11:40 AM   Result Value Ref Range    Sodium Level 139 136 - 145 mmol/L    Potassium Level 3.8 3.5 - 5.1 mmol/L    Chloride 105 98 - 107 mmol/L    Carbon Dioxide 26 22 - 29 mmol/L    Glucose Level 84 74 - 100 mg/dL    Blood Urea Nitrogen 6.7 (L) 8.4 - 25.7 mg/dL    Creatinine 1.04 0.73 - 1.18 mg/dL    Calcium Level Total 9.5 8.4 - 10.2 mg/dL    Protein Total 7.8 6.4 - 8.3 gm/dL    Albumin Level 4.1 3.5 - 5.0 g/dL    Globulin 3.7 (H) 2.4 - 3.5 gm/dL    Albumin/Globulin Ratio 1.1 1.1 - 2.0 ratio    Bilirubin Total 0.9 <=1.5 mg/dL    Alkaline Phosphatase 60 40 - 150 unit/L    Alanine Aminotransferase 121 (H) 0 - 55 unit/L    Aspartate Aminotransferase 123 (H) 5 - 34 unit/L    eGFR >60 mls/min/1.73/m2   TSH    Collection Time: 02/02/24 11:40 AM   Result Value Ref Range    TSH 0.071 (L) 0.350 - 4.940 uIU/mL   Urinalysis, Reflex to Urine Culture    Collection Time: 02/02/24 11:40 AM    Specimen: Urine   Result Value Ref Range    Color, UA Colorless (A) Yellow, Light-Yellow, Dark Yellow, [place], Straw    Appearance, UA Clear Clear    Specific Gravity, UA <1.005 (L) 1.005 - 1.030    pH, UA 5.0 5.0 - 8.5    Protein, UA Negative Negative    Glucose, UA Normal Negative, Normal    Ketones, UA Negative Negative    Blood, UA Trace (A) Negative    Bilirubin, UA Negative Negative    Urobilinogen, UA Normal 0.2, 1.0, Normal    Nitrites, UA Negative Negative    Leukocyte Esterase, UA Negative Negative    WBC, UA 0-5 None Seen, 0-2, 3-5, 0-5 /HPF    Bacteria, UA Trace (A) None Seen /HPF    Squamous Epithelial Cells, UA None Seen None Seen /HPF    Hyaline Casts, UA None Seen None Seen /lpf    RBC, UA 0-5 None Seen, 0-2, 3-5, 0-5 /HPF   CBC with Differential    Collection Time: 02/02/24 11:40 AM   Result Value Ref Range    WBC 9.30 4.50 - 11.50 x10(3)/mcL    RBC 4.47 (L) 4.70 - 6.10  x10(6)/mcL    Hgb 14.9 14.0 - 18.0 g/dL    Hct 42.7 42.0 - 52.0 %    MCV 95.5 (H) 80.0 - 94.0 fL    MCH 33.3 (H) 27.0 - 31.0 pg    MCHC 34.9 33.0 - 36.0 g/dL    RDW 11.1 (L) 11.5 - 17.0 %    Platelet 259 130 - 400 x10(3)/mcL    MPV 8.8 7.4 - 10.4 fL    Neut % 48.0 %    Lymph % 35.9 %    Mono % 14.4 %    Eos % 0.9 %    Basophil % 0.3 %    Lymph # 3.34 0.6 - 4.6 x10(3)/mcL    Neut # 4.46 2.1 - 9.2 x10(3)/mcL    Mono # 1.34 (H) 0.1 - 1.3 x10(3)/mcL    Eos # 0.08 0 - 0.9 x10(3)/mcL    Baso # 0.03 <=0.2 x10(3)/mcL    IG# 0.05 (H) 0 - 0.04 x10(3)/mcL    IG% 0.5 %    NRBC% 0.0 %   T4, Free    Collection Time: 02/02/24 11:40 AM   Result Value Ref Range    Thyroxine Free 0.97 0.70 - 1.48 ng/dL         Follow Up:  Follow up in about 1 year (around 2/2/2025).      Previous medical history/lab work/radiology reviewed and considered during medical management decisions.   Medication list reviewed and medication reconciliation performed.  Patient was provided  and care about his/her current diagnosis (es) and medications including risk/benefit and side effects/adverse events, over the counter medication uses/doses, home self-care and contact precautions,  and red flags and indications for when to seek immediate medical attention.   Patient was advised to continue compliance with current medication list and medical recommendations.  Patient dvised continued compliance with recommended eating habits/ diets for medical conditions and exercise 150 minutes/ week (if possible) for medical condition (s).  Educational handouts and instructions on selected disease management in AVS (After Visit Summary).    All of the patient's questions were answered to patient's satisfaction.   The patient was receptive, expressed verbal understanding and agreement the above plan.        This note was created with the assistance of a voice recognition software or phone dictation. There may be transcription errors as a result of using this  technology however minimal. Effort has been made to assure accuracy of transcription but any obvious errors or omissions should be clarified with the author of the document

## 2024-02-05 ENCOUNTER — TELEPHONE (OUTPATIENT)
Dept: FAMILY MEDICINE | Facility: CLINIC | Age: 59
End: 2024-02-05
Payer: MEDICAID

## 2024-02-05 NOTE — TELEPHONE ENCOUNTER
----- Message from DM Jacobs sent at 2/5/2024  9:46 AM CST -----  PLEASE CALL PATIENTS WITH RESULTS,   Urine no sign of infection.  Stay hydrated with water.  Chemistry, elevated liver function.  Patient drinks alcohol.  Recommended decreasing alcohol or stoppage to prevent liver damage.  We will continue to monitor.  Blood count no sign of , infection.  Possible vitamin B12 deficiency due to alcohol abuse.  Please instruct patient he can start over-the-counter multivitamin such as centrum 50+.  Stay hydrated with water.  Thyroid function test slightly increased.  This is probably due to alcohol and cigarette abuse.  We will continue to monitor.  Will check levels on next visit.  Prostate specific antigen within normal range.  Will continue to monitor yearly.

## 2024-02-05 NOTE — PROGRESS NOTES
PLEASE CALL PATIENTS WITH RESULTS,   Urine no sign of infection.  Stay hydrated with water.  Chemistry, elevated liver function.  Patient drinks alcohol.  Recommended decreasing alcohol or stoppage to prevent liver damage.  We will continue to monitor.  Blood count no sign of , infection.  Possible vitamin B12 deficiency due to alcohol abuse.  Please instruct patient he can start over-the-counter multivitamin such as centrum 50+.  Stay hydrated with water.  Thyroid function test slightly increased.  This is probably due to alcohol and cigarette abuse.  We will continue to monitor.  Will check levels on next visit.  Prostate specific antigen within normal range.  Will continue to monitor yearly.

## 2024-02-06 ENCOUNTER — TELEPHONE (OUTPATIENT)
Dept: FAMILY MEDICINE | Facility: CLINIC | Age: 59
End: 2024-02-06
Payer: MEDICAID

## 2024-02-07 ENCOUNTER — TELEPHONE (OUTPATIENT)
Dept: FAMILY MEDICINE | Facility: CLINIC | Age: 59
End: 2024-02-07
Payer: MEDICAID

## 2024-02-07 NOTE — TELEPHONE ENCOUNTER
Attempted to call patient x 3. There was no answer and no call back. Letter will be mailed.     ----- Message from DM Jacobs sent at 2/5/2024  9:46 AM CST -----  PLEASE CALL PATIENTS WITH RESULTS,   Urine no sign of infection.  Stay hydrated with water.  Chemistry, elevated liver function.  Patient drinks alcohol.  Recommended decreasing alcohol or stoppage to prevent liver damage.  We will continue to monitor.  Blood count no sign of , infection.  Possible vitamin B12 deficiency due to alcohol abuse.  Please instruct patient he can start over-the-counter multivitamin such as centrum 50+.  Stay hydrated with water.  Thyroid function test slightly increased.  This is probably due to alcohol and cigarette abuse.  We will continue to monitor.  Will check levels on next visit.  Prostate specific antigen within normal range.  Will continue to monitor yearly.

## 2024-05-06 PROBLEM — Z00.00 ANNUAL PHYSICAL EXAM: Status: RESOLVED | Noted: 2024-02-02 | Resolved: 2024-05-06

## 2025-02-03 DIAGNOSIS — I10 PRIMARY HYPERTENSION: ICD-10-CM

## 2025-02-03 DIAGNOSIS — Z76.0 ENCOUNTER FOR MEDICATION REFILL: ICD-10-CM

## 2025-02-04 RX ORDER — HYDRALAZINE HYDROCHLORIDE 50 MG/1
50 TABLET, FILM COATED ORAL DAILY
Qty: 90 TABLET | Refills: 2 | Status: SHIPPED | OUTPATIENT
Start: 2025-02-04 | End: 2026-02-04

## 2025-02-04 RX ORDER — LISINOPRIL 20 MG/1
20 TABLET ORAL DAILY
Qty: 90 TABLET | Refills: 2 | Status: SHIPPED | OUTPATIENT
Start: 2025-02-04 | End: 2026-02-04

## 2025-02-04 RX ORDER — AMLODIPINE BESYLATE 10 MG/1
10 TABLET ORAL DAILY
Qty: 90 TABLET | Refills: 2 | Status: SHIPPED | OUTPATIENT
Start: 2025-02-04 | End: 2026-02-04

## 2025-02-04 RX ORDER — ASPIRIN 81 MG/1
81 TABLET ORAL DAILY
Qty: 90 TABLET | Refills: 2 | Status: SHIPPED | OUTPATIENT
Start: 2025-02-04 | End: 2026-02-04

## 2025-07-10 DIAGNOSIS — Z76.0 ENCOUNTER FOR MEDICATION REFILL: ICD-10-CM

## 2025-07-10 DIAGNOSIS — A04.8 H. PYLORI INFECTION: ICD-10-CM

## 2025-07-10 DIAGNOSIS — I10 PRIMARY HYPERTENSION: ICD-10-CM

## 2025-07-10 DIAGNOSIS — K29.80 DUODENITIS: ICD-10-CM

## 2025-07-10 RX ORDER — LISINOPRIL 20 MG/1
20 TABLET ORAL DAILY
Qty: 30 TABLET | Refills: 0 | Status: SHIPPED | OUTPATIENT
Start: 2025-07-10 | End: 2025-08-09

## 2025-07-10 RX ORDER — PANTOPRAZOLE SODIUM 40 MG/1
40 TABLET, DELAYED RELEASE ORAL DAILY
Qty: 30 TABLET | Refills: 0 | Status: SHIPPED | OUTPATIENT
Start: 2025-07-10 | End: 2025-08-09

## 2025-07-10 RX ORDER — METOPROLOL TARTRATE 50 MG/1
50 TABLET ORAL 2 TIMES DAILY
Qty: 60 TABLET | Refills: 0 | Status: SHIPPED | OUTPATIENT
Start: 2025-07-10 | End: 2025-08-09

## 2025-07-10 RX ORDER — HYDRALAZINE HYDROCHLORIDE 50 MG/1
50 TABLET, FILM COATED ORAL DAILY
Qty: 30 TABLET | Refills: 0 | Status: SHIPPED | OUTPATIENT
Start: 2025-07-10 | End: 2025-08-09

## 2025-07-10 RX ORDER — AMLODIPINE BESYLATE 10 MG/1
10 TABLET ORAL DAILY
Qty: 30 TABLET | Refills: 0 | Status: SHIPPED | OUTPATIENT
Start: 2025-07-10 | End: 2025-08-09

## 2025-07-24 ENCOUNTER — OFFICE VISIT (OUTPATIENT)
Dept: INTERNAL MEDICINE | Facility: CLINIC | Age: 60
End: 2025-07-24
Payer: MEDICAID

## 2025-07-24 VITALS
RESPIRATION RATE: 18 BRPM | SYSTOLIC BLOOD PRESSURE: 169 MMHG | TEMPERATURE: 98 F | HEIGHT: 68 IN | WEIGHT: 173 LBS | HEART RATE: 73 BPM | DIASTOLIC BLOOD PRESSURE: 90 MMHG | BODY MASS INDEX: 26.22 KG/M2

## 2025-07-24 DIAGNOSIS — Z76.0 ENCOUNTER FOR MEDICATION REFILL: ICD-10-CM

## 2025-07-24 DIAGNOSIS — Z12.11 ENCOUNTER FOR COLORECTAL CANCER SCREENING: ICD-10-CM

## 2025-07-24 DIAGNOSIS — B19.20 COMPENSATED CIRRHOSIS RELATED TO HEPATITIS C VIRUS (HCV): ICD-10-CM

## 2025-07-24 DIAGNOSIS — E78.2 MIXED HYPERLIPIDEMIA: ICD-10-CM

## 2025-07-24 DIAGNOSIS — R74.8 ELEVATED LIVER ENZYMES: ICD-10-CM

## 2025-07-24 DIAGNOSIS — I10 PRIMARY HYPERTENSION: Primary | ICD-10-CM

## 2025-07-24 DIAGNOSIS — N52.2 DRUG-INDUCED ERECTILE DYSFUNCTION: ICD-10-CM

## 2025-07-24 DIAGNOSIS — F17.200 NEEDS SMOKING CESSATION EDUCATION: ICD-10-CM

## 2025-07-24 DIAGNOSIS — K74.69 COMPENSATED CIRRHOSIS RELATED TO HEPATITIS C VIRUS (HCV): ICD-10-CM

## 2025-07-24 DIAGNOSIS — Z12.11 SCREENING FOR COLON CANCER: ICD-10-CM

## 2025-07-24 DIAGNOSIS — Z12.12 ENCOUNTER FOR COLORECTAL CANCER SCREENING: ICD-10-CM

## 2025-07-24 DIAGNOSIS — F10.10 ALCOHOL ABUSE: ICD-10-CM

## 2025-07-24 PROCEDURE — 3008F BODY MASS INDEX DOCD: CPT | Mod: CPTII,,, | Performed by: NURSE PRACTITIONER

## 2025-07-24 PROCEDURE — 3077F SYST BP >= 140 MM HG: CPT | Mod: CPTII,,, | Performed by: NURSE PRACTITIONER

## 2025-07-24 PROCEDURE — 4010F ACE/ARB THERAPY RXD/TAKEN: CPT | Mod: CPTII,,, | Performed by: NURSE PRACTITIONER

## 2025-07-24 PROCEDURE — 1160F RVW MEDS BY RX/DR IN RCRD: CPT | Mod: CPTII,,, | Performed by: NURSE PRACTITIONER

## 2025-07-24 PROCEDURE — 1159F MED LIST DOCD IN RCRD: CPT | Mod: CPTII,,, | Performed by: NURSE PRACTITIONER

## 2025-07-24 PROCEDURE — 99214 OFFICE O/P EST MOD 30 MIN: CPT | Mod: S$PBB,,, | Performed by: NURSE PRACTITIONER

## 2025-07-24 PROCEDURE — 3080F DIAST BP >= 90 MM HG: CPT | Mod: CPTII,,, | Performed by: NURSE PRACTITIONER

## 2025-07-24 PROCEDURE — 99214 OFFICE O/P EST MOD 30 MIN: CPT | Mod: PBBFAC | Performed by: NURSE PRACTITIONER

## 2025-07-24 RX ORDER — AMLODIPINE BESYLATE 10 MG/1
10 TABLET ORAL DAILY
Qty: 90 TABLET | Refills: 1 | Status: SHIPPED | OUTPATIENT
Start: 2025-07-24 | End: 2026-07-24

## 2025-07-24 RX ORDER — METOPROLOL TARTRATE 50 MG/1
50 TABLET ORAL 2 TIMES DAILY
Qty: 180 TABLET | Refills: 1 | Status: SHIPPED | OUTPATIENT
Start: 2025-07-24 | End: 2026-07-24

## 2025-07-24 RX ORDER — SILDENAFIL 50 MG/1
50 TABLET, FILM COATED ORAL DAILY PRN
Qty: 30 TABLET | Refills: 1 | Status: SHIPPED | OUTPATIENT
Start: 2025-07-24

## 2025-07-24 RX ORDER — LISINOPRIL 20 MG/1
20 TABLET ORAL DAILY
Qty: 90 TABLET | Refills: 1 | Status: SHIPPED | OUTPATIENT
Start: 2025-07-24 | End: 2026-07-24

## 2025-07-24 RX ORDER — ASPIRIN 81 MG/1
81 TABLET ORAL DAILY
Qty: 90 TABLET | Refills: 1 | Status: SHIPPED | OUTPATIENT
Start: 2025-07-24 | End: 2026-07-24

## 2025-07-24 RX ORDER — HYDRALAZINE HYDROCHLORIDE 50 MG/1
50 TABLET, FILM COATED ORAL DAILY
Qty: 90 TABLET | Refills: 1 | Status: SHIPPED | OUTPATIENT
Start: 2025-07-24 | End: 2026-07-24

## 2025-07-24 NOTE — PROGRESS NOTES
Patient ID: [id number]     Chief Complaint: Establish Care        HPI:     PADMA Ramon Jr. is a 60 y.o. male here today for a follow up.         Optometrist:  Dentist:  Breast Cancer Screening:  [unfilled]   Cervical Cancer Screening:     Colorectal Cancer Screening:  Diabetic Eye Exam:  Diabetic Foot Exam:  Lung Cancer Screening:    Prostate Cancer Screening:  AAA Screening:  Osteoporosis Screening:  Medicare Wellness:   Immunizations:   Immunization History   Administered Date(s) Administered    COVID-19, MRNA, LN-S, PF (Pfizer) (Gray Cap) 06/07/2022    COVID-19, MRNA, LN-S, PF (Pfizer) (Purple Cap) 08/13/2021, 09/03/2021, 03/03/2022    Hepatitis A, Adult 11/08/2021    Hepatitis B 12/21/2021, 01/20/2022, 06/21/2022    Hepatitis B, Adult 12/21/2021, 01/20/2022, 06/21/2022    Influenza - Quadrivalent 11/16/2020, 11/08/2021    Tdap 01/20/2022        -------------------------------------    Cirrhosis    HLD (hyperlipidemia)    Hypertension    Stroke    Unilateral inguinal hernia, without obstruction or gangrene, not specified as recurrent    Left-sided        Past Surgical History:   Procedure Laterality Date    BRAIN SURGERY         Review of patient's allergies indicates:  No Known Allergies    Current Outpatient Medications   Medication Instructions    amLODIPine (NORVASC) 10 mg, Oral, Daily    aspirin (ECOTRIN) 81 mg, Oral, Daily    atorvastatin (LIPITOR) 20 MG tablet TAKE 1 TABLET(20 MG) BY MOUTH EVERY DAY AT BEDTIME    chlorhexidine (PERIDEX) 0.12 % solution 15 mLs, 2 times daily    hydrALAZINE (APRESOLINE) 50 mg, Oral, Daily    lisinopriL (PRINIVIL,ZESTRIL) 20 mg, Oral, Daily    metoprolol tartrate (LOPRESSOR) 50 mg, Oral, 2 times daily    pantoprazole (PROTONIX) 40 mg, Oral, Daily    sildenafiL (VIAGRA) 50 mg, Oral, Daily PRN       Social History[1]     Family History   Problem Relation Name Age of Onset    Diabetes Mother      Breast cancer Mother      Lung disease Father      No Known Problems  "Sister          Patient Care Team:  Tasha Galaviz FNP as PCP - General (Family Medicine)     Subjective:     Review of Systems     See HPI for details    Constitutional: Denies Change in appetite. Denies Chills. Denies Fever. Denies Night sweats.  Eye: Denies Blurred vision. Denies Discharge. Denies Eye pain.  ENT: Denies Decreased hearing. Denies Sore throat. Denies Swollen glands.  Respiratory: Denies Cough. Denies Shortness of breath. Denies Shortness of breath with exertion. Denies Wheezing.  Cardiovascular: DeniesChest pain at rest. Denies Chest pain with exertion. Denies Irregular heartbeat. Denies Palpitations. Denies Edema.  Gastrointestinal: Denies Abdominal pain. DeniesDiarrhea. Denies Nausea. Denies Vomiting. Denies Hematemesis or Hematochezia.  Genitourinary: Denies Dysuria. Denies Urinary frequency. Denies Urinary urgency. Denies Blood in urine.  Endocrine: Denies Cold intolerance. Denies Excessive thirst. Denies Heat intolerance. Denies Weight loss. Denies Weight gain.  Musculoskeletal: Denies Painful joints. Denies Weakness.  Integumentary: Denies Rash. Denies Itching. Denies Dry skin.  Neurologic: Denies Dizziness. Denies Fainting. Denies Headache.  Psychiatric: Denies Depression. Denies Anxiety. Denies Suicidal/Homicidal ideations.    All Other ROS: Negative except as stated in HPI.       Objective:     Visit Vitals  BP (!) 169/90   Pulse 73   Temp 97.6 °F (36.4 °C) (Oral)   Resp 18   Ht 5' 8" (1.727 m)   Wt 78.5 kg (173 lb)   BMI 26.30 kg/m²       Physical Exam    General: Alert and oriented, No acute distress.  Head: Normocephalic, Atraumatic.  Eye: Pupils are equal, round and reactive to light, Extraocular movements are intact, Sclera non-icteric.  Ears/Nose/Throat: Normal, Mucosa moist,Clear.  Neck/Thyroid: Supple, Non-tender, No carotid bruit, No lymphadenopathy, No JVD, Full range of motion.  Respiratory: Clear to auscultation bilaterally; No wheezes, rales or rhonchi,Non-labored " respirations, Symmetrical chest wall expansion.  Cardiovascular: Regular rate and rhythm, S1/S2 normal, No murmurs, rubs or gallops.  Gastrointestinal: Soft, Non-tender, Non-distended, Normal bowel sounds, No palpable organomegaly.  Musculoskeletal: Normal range of motion.  Integumentary: Warm, Dry, Intact, No suspicious lesions or rashes.  Extremities: No clubbing, cyanosis or edema  Neurologic: No focal deficits, Cranial Nerves II-XII are grossly intact, Motor strength normal upper and lower extremities, Sensory exam intact.  Psychiatric: Normal interaction, Coherent speech, Euthymic mood, Appropriate affect       Labs Reviewed:     Chemistry:  Lab Results   Component Value Date     02/02/2024     (L) 09/24/2020    K 3.8 02/02/2024    K 4.2 09/24/2020    BUN 6.7 (L) 02/02/2024    BUN 22 09/24/2020    CREATININE 1.04 02/02/2024    CREATININE 1.04 09/24/2020    EGFRNORACEVR >60 02/02/2024    CALCIUM 9.5 02/02/2024    CALCIUM 9.4 09/24/2020    ALKPHOS 60 02/02/2024    LABPROT 13.7 09/17/2020    ALBUMIN 4.1 02/02/2024    BILIDIR 0.5 04/21/2022    IBILI 0.50 04/21/2022     (H) 02/02/2024     (H) 02/02/2024    MG 2.1 09/15/2020        Lab Results   Component Value Date    HGBA1C 4.8 06/08/2021        Hematology:  Lab Results   Component Value Date    WBC 9.30 02/02/2024    HGB 14.9 02/02/2024    HCT 42.7 02/02/2024     02/02/2024       Lipid Panel:  Lab Results   Component Value Date    CHOL 117 04/21/2022    HDL 34 04/21/2022    LDL 70.00 04/21/2022    TRIG 67 04/21/2022    TOTALCHOLEST 3 04/21/2022        Urine:  Lab Results   Component Value Date    APPEARANCEUA Clear 02/02/2024    SGUA <1.005 (L) 02/02/2024    PROTEINUA Negative 02/02/2024    KETONESUA Negative 02/02/2024    LEUKOCYTESUR Negative 02/02/2024    RBCUA 0-5 02/02/2024    WBCUA 0-5 02/02/2024    BACTERIA Trace (A) 02/02/2024    SQEPUA None Seen 02/02/2024    HYALINECASTS None Seen 02/02/2024        Assessment:        ICD-10-CM ICD-9-CM   1. Primary hypertension  I10 401.9   2. Mixed hyperlipidemia  E78.2 272.2   3. Alcohol abuse  F10.10 305.00   4. 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 Encounter for colorectal cancer screening  Z12.11 V76.51    Z12.12 V76.41   6. Elevated liver enzymes  R74.8 790.5        Plan:     1. Primary hypertension (Primary)  BP elevated. Pt admits to last taking BP med 2 days ago as he restarted BP meds and then experienced episode of chest discomfort. States symptoms have resolved. ER precautions given. Pt states he took meds after episode and never reoccurred. Low fat low salt diet and exercise. Cont Amlodipine, Hydralazine, Lisinopril, Metoprolol as prescribed.     2. Mixed hyperlipidemia  Low fat diet and exercise. Cont Atorvastatin as prescribed.     3. Alcohol abuse  Encouraged ETOH cessation. Education provided.     4. 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 Will repeat CMP in 4 months. Refer to GI cl for eval and mgmt.     5. Encounter for colorectal cancer screening  Pt failed Cologuard X 3 attempts. Will order FIT test before prior  appt.      6. Elevated liver enzymes.   CMP in 4 months. Avoid Tylenol and ETOH. Refer to GI cl for eval and mgmt.     Follow up in about 4 months (around 11/24/2025) for with labs 1 week prior to appt. . In addition to their scheduled follow up, the patient has also been instructed to follow up on as needed basis.     No future appointments.     DM Marie             [1]   Social History  Socioeconomic History    Marital status: Single    Highest education level: 11th grade   Occupational History    Occupation: disabled   Tobacco Use    Smoking status: Every Day     Current packs/day: 1.50     Average packs/day: 1.5 packs/day for 48.6 years (72.8 ttl pk-yrs)     Types: Cigarettes     Start date: 1/1/1977    Smokeless tobacco: Never    Tobacco  comments:     Patient requesting not to be asked regarding smoke cessation.   Substance and Sexual Activity    Alcohol use: Yes     Alcohol/week: 6.0 - 8.0 standard drinks of alcohol     Types: 6 - 8 Cans of beer per week     Comment: Patient requesting not to be ask regarding alcohol intake.    Drug use: Yes     Frequency: 7.0 times per week     Types: Marijuana     Comment: past-cocaine    Sexual activity: Yes     Partners: Female     Social Drivers of Health     Financial Resource Strain: Low Risk  (4/25/2023)    Overall Financial Resource Strain (CARDIA)     Difficulty of Paying Living Expenses: Not hard at all   Food Insecurity: No Food Insecurity (4/25/2023)    Hunger Vital Sign     Worried About Running Out of Food in the Last Year: Never true     Ran Out of Food in the Last Year: Never true   Transportation Needs: No Transportation Needs (4/25/2023)    PRAPARE - Transportation     Lack of Transportation (Medical): No     Lack of Transportation (Non-Medical): No   Physical Activity: Insufficiently Active (4/25/2023)    Exercise Vital Sign     Days of Exercise per Week: 2 days     Minutes of Exercise per Session: 20 min   Stress: No Stress Concern Present (4/25/2023)    [employer] - Occupational Stress Questionnaire     Feeling of Stress : Not at all   Housing Stability: Low Risk  (4/25/2023)    Housing Stability Vital Sign     Unable to Pay for Housing in the Last Year: No     Number of Places Lived in the Last Year: 1     Unstable Housing in the Last Year: No

## (undated) DEVICE — KIT SURGICAL COLON .25 1.1OZ

## (undated) DEVICE — MOUTHPIECE ENDO 60FR

## (undated) DEVICE — FORCEP ALLIGATOR 2.8MM W/NDL

## (undated) DEVICE — MANIFOLD 4 PORT